# Patient Record
Sex: FEMALE | Race: WHITE | NOT HISPANIC OR LATINO | Employment: OTHER | URBAN - METROPOLITAN AREA
[De-identification: names, ages, dates, MRNs, and addresses within clinical notes are randomized per-mention and may not be internally consistent; named-entity substitution may affect disease eponyms.]

---

## 2017-01-04 ENCOUNTER — GENERIC CONVERSION - ENCOUNTER (OUTPATIENT)
Dept: OTHER | Facility: OTHER | Age: 52
End: 2017-01-04

## 2017-01-19 ENCOUNTER — GENERIC CONVERSION - ENCOUNTER (OUTPATIENT)
Dept: OTHER | Facility: OTHER | Age: 52
End: 2017-01-19

## 2017-01-26 ENCOUNTER — GENERIC CONVERSION - ENCOUNTER (OUTPATIENT)
Dept: OTHER | Facility: OTHER | Age: 52
End: 2017-01-26

## 2017-05-03 ENCOUNTER — ALLSCRIPTS OFFICE VISIT (OUTPATIENT)
Dept: OTHER | Facility: OTHER | Age: 52
End: 2017-05-03

## 2017-05-31 ENCOUNTER — GENERIC CONVERSION - ENCOUNTER (OUTPATIENT)
Dept: OTHER | Facility: OTHER | Age: 52
End: 2017-05-31

## 2017-06-28 ENCOUNTER — GENERIC CONVERSION - ENCOUNTER (OUTPATIENT)
Dept: OTHER | Facility: OTHER | Age: 52
End: 2017-06-28

## 2017-07-11 ENCOUNTER — GENERIC CONVERSION - ENCOUNTER (OUTPATIENT)
Dept: OTHER | Facility: OTHER | Age: 52
End: 2017-07-11

## 2017-07-20 ENCOUNTER — GENERIC CONVERSION - ENCOUNTER (OUTPATIENT)
Dept: OTHER | Facility: OTHER | Age: 52
End: 2017-07-20

## 2017-08-03 ENCOUNTER — ALLSCRIPTS OFFICE VISIT (OUTPATIENT)
Dept: OTHER | Facility: OTHER | Age: 52
End: 2017-08-03

## 2017-08-24 ENCOUNTER — GENERIC CONVERSION - ENCOUNTER (OUTPATIENT)
Dept: OTHER | Facility: OTHER | Age: 52
End: 2017-08-24

## 2018-01-10 NOTE — PROCEDURES
Results/Data    Procedure: Electromyogram and Nerve Conduction Study  Indication: Bilateral Upper Extremities   Referred by Dr Kate Nobles  The procedure's were discussed with the patient  Written consent was obtained prior to the procedure and is detailed in the patient's record  Prior to the start of the procedure a time out was taken and the identity of the patient was confirmed via name and date of birth with the patient  The correct site and the procedure to be performed were confirmed  The correct side was confirmed if applicable  The positioning of the patient was verified  The availability of the correct equipment was verified  Procedure Start Time: 12:45    Technique: A sterile concentric needle electrode was used  The patient tolerated the procedure well  There were no complications  Results   : Motor and sensory nerve conduction studies were performed on the bilateral median and ulnar nerves  The right median motor terminal latency was prolonged with a normal compound motor action potential amplitude and a normal conduction velocity across the wrist  The left median motor terminal latency was within normal limits with a low compound motor action potential amplitude and a normal conduction velocity across the wrist  The bilateral ulnar compound motor action potentials were within normal limits  The bilateral median and ulnar F wave latencies were within normal limits  The right median sensory peak latency was prolonged with a low sensory action potential amplitude  The left median sensory peak latency was prolonged with a low sensory action potential amplitude  The bilateral ulnar sensory action potentials were within normal limits  The right median palmar evoked response was prolonged by 2 0 ms as compared to the right ulnar palmar evoked response at the same distance   The left median palmar evoked response was prolonged by 3 0 ms as compared to the left ulnar palmar evoked response at the same distance  Concentric needle examination was performed on various proximal and distal muscles bilaterally including deltoid, biceps, triceps, pronator teres, APB, FDI and low cervical paraspinals  There was no evidence of active denervation any of the muscles tested  Moderate decreased recruitment of giant motor units was noted in the left APB and mild decreased recruitment of giant  motor units was noted in the right APB and left deltoid and biceps    The compound motor unit action potentials were of normal configuration with interference patterns being full or full for effort in the remaining muscles tested  Interpretation: There is electrophysiologic evidence of a:    1  Bilateral moderate median nerve compression neuropathy at the wrist with demyelinative and axonal changes on the left and predominant demyelinative changes on the right, consistent with a diagnosis of carpal tunnel syndrome  2  Mild chronic left C5 radiculopathy as evidenced by the decreased recruitment and chronic denervation changes in the above-mentioned muscles  3  There is no evidence of a ulnar neuropathy bilaterally  Clinical correlation is recommended        Signatures   Electronically signed by : Coco Cleveland MD; Nov 15 2016 10:40AM EST                       (Author)

## 2018-01-13 VITALS
DIASTOLIC BLOOD PRESSURE: 82 MMHG | RESPIRATION RATE: 16 BRPM | SYSTOLIC BLOOD PRESSURE: 128 MMHG | HEART RATE: 76 BPM | HEIGHT: 62 IN | WEIGHT: 257 LBS | BODY MASS INDEX: 47.29 KG/M2

## 2018-01-13 VITALS
SYSTOLIC BLOOD PRESSURE: 126 MMHG | HEART RATE: 85 BPM | WEIGHT: 254.13 LBS | HEIGHT: 62 IN | BODY MASS INDEX: 46.76 KG/M2 | DIASTOLIC BLOOD PRESSURE: 78 MMHG

## 2018-04-18 ENCOUNTER — TELEPHONE (OUTPATIENT)
Dept: NEUROLOGY | Facility: CLINIC | Age: 53
End: 2018-04-18

## 2018-04-18 NOTE — TELEPHONE ENCOUNTER
Called to clarify reason for visit ( CTS or MACRINA) reminded her to please bring her CPAP Memory card to [facility] so they can download CPAP report

## 2018-04-25 ENCOUNTER — OFFICE VISIT (OUTPATIENT)
Dept: NEUROLOGY | Facility: CLINIC | Age: 53
End: 2018-04-25
Payer: MEDICARE

## 2018-04-25 VITALS
HEIGHT: 62 IN | DIASTOLIC BLOOD PRESSURE: 98 MMHG | WEIGHT: 242 LBS | SYSTOLIC BLOOD PRESSURE: 144 MMHG | BODY MASS INDEX: 44.53 KG/M2 | HEART RATE: 88 BPM

## 2018-04-25 DIAGNOSIS — G47.33 OSA (OBSTRUCTIVE SLEEP APNEA): Primary | ICD-10-CM

## 2018-04-25 PROCEDURE — 99213 OFFICE O/P EST LOW 20 MIN: CPT | Performed by: PSYCHIATRY & NEUROLOGY

## 2018-04-25 RX ORDER — AMLODIPINE AND VALSARTAN 5; 320 MG/1; MG/1
TABLET ORAL
COMMUNITY
Start: 2016-08-05 | End: 2018-04-25

## 2018-04-25 RX ORDER — SPIRONOLACTONE 100 MG/1
100 TABLET, FILM COATED ORAL EVERY MORNING
COMMUNITY
Start: 2018-01-24 | End: 2020-09-22

## 2018-04-25 RX ORDER — PHENTERMINE HYDROCHLORIDE 37.5 MG/1
37.5 CAPSULE ORAL EVERY MORNING
COMMUNITY
End: 2019-06-07 | Stop reason: ALTCHOICE

## 2018-04-25 RX ORDER — FOLIC ACID 1 MG/1
1 TABLET ORAL DAILY
COMMUNITY
Start: 2018-03-26 | End: 2021-09-30

## 2018-04-25 RX ORDER — MELOXICAM 7.5 MG/1
7.5 TABLET ORAL DAILY PRN
COMMUNITY
Start: 2017-03-23 | End: 2018-04-25

## 2018-04-25 RX ORDER — ASPIRIN 81 MG/1
81 TABLET, CHEWABLE ORAL DAILY
COMMUNITY

## 2018-04-25 RX ORDER — TOPIRAMATE 100 MG/1
100 TABLET, FILM COATED ORAL EVERY MORNING
COMMUNITY
Start: 2018-03-01 | End: 2019-12-10 | Stop reason: ALTCHOICE

## 2018-04-25 RX ORDER — HYDROXYCHLOROQUINE SULFATE 200 MG/1
200 TABLET, FILM COATED ORAL
COMMUNITY
Start: 2017-09-06 | End: 2021-09-30

## 2018-04-25 RX ORDER — DESONIDE 0.5 MG/G
CREAM TOPICAL AS NEEDED
COMMUNITY
Start: 2017-04-21 | End: 2020-10-23 | Stop reason: SDUPTHER

## 2018-04-25 RX ORDER — LORAZEPAM 1 MG/1
TABLET ORAL DAILY PRN
COMMUNITY
Start: 2016-09-19 | End: 2021-09-30

## 2018-04-25 RX ORDER — ZOLPIDEM TARTRATE 12.5 MG/1
1 TABLET, FILM COATED, EXTENDED RELEASE ORAL
COMMUNITY
End: 2018-04-25

## 2018-04-25 RX ORDER — VENLAFAXINE HYDROCHLORIDE 225 MG/1
1 TABLET, EXTENDED RELEASE ORAL DAILY
COMMUNITY

## 2018-04-25 RX ORDER — MAG HYDROX/ALUMINUM HYD/SIMETH 400-400-40
5000 SUSPENSION, ORAL (FINAL DOSE FORM) ORAL DAILY
COMMUNITY
Start: 2016-11-23

## 2018-04-25 RX ORDER — ACETAMINOPHEN 325 MG/1
325 TABLET ORAL DAILY PRN
COMMUNITY

## 2018-04-25 RX ORDER — TOPIRAMATE 100 MG/1
25 TABLET, FILM COATED ORAL 2 TIMES DAILY
COMMUNITY
End: 2019-06-07 | Stop reason: DRUGHIGH

## 2018-04-25 RX ORDER — TRAMADOL HYDROCHLORIDE 50 MG/1
1 TABLET ORAL 3 TIMES DAILY PRN
COMMUNITY
End: 2019-06-07 | Stop reason: HOSPADM
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
## 2018-10-25 ENCOUNTER — OFFICE VISIT (OUTPATIENT)
Dept: NEUROLOGY | Facility: CLINIC | Age: 53
End: 2018-10-25
Payer: MEDICARE

## 2018-10-25 VITALS
HEIGHT: 62 IN | DIASTOLIC BLOOD PRESSURE: 80 MMHG | BODY MASS INDEX: 41.92 KG/M2 | RESPIRATION RATE: 12 BRPM | HEART RATE: 108 BPM | WEIGHT: 227.8 LBS | SYSTOLIC BLOOD PRESSURE: 140 MMHG

## 2018-10-25 DIAGNOSIS — G47.33 OSA (OBSTRUCTIVE SLEEP APNEA): Primary | ICD-10-CM

## 2018-10-25 PROCEDURE — 99213 OFFICE O/P EST LOW 20 MIN: CPT | Performed by: PSYCHIATRY & NEUROLOGY

## 2018-10-25 RX ORDER — MELOXICAM 7.5 MG/1
TABLET ORAL
COMMUNITY
Start: 2017-03-23 | End: 2019-12-10 | Stop reason: ALTCHOICE

## 2018-10-25 RX ORDER — ZOLPIDEM TARTRATE 12.5 MG/1
TABLET, FILM COATED, EXTENDED RELEASE ORAL
COMMUNITY
Start: 2015-04-16 | End: 2019-06-07 | Stop reason: HOSPADM
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
## 2019-06-07 ENCOUNTER — OFFICE VISIT (OUTPATIENT)
Dept: NEUROLOGY | Facility: CLINIC | Age: 54
End: 2019-06-07
Payer: MEDICARE

## 2019-06-07 VITALS
HEART RATE: 76 BPM | DIASTOLIC BLOOD PRESSURE: 82 MMHG | WEIGHT: 244.8 LBS | BODY MASS INDEX: 45.05 KG/M2 | SYSTOLIC BLOOD PRESSURE: 102 MMHG | HEIGHT: 62 IN

## 2019-06-07 DIAGNOSIS — G47.33 OSA (OBSTRUCTIVE SLEEP APNEA): Primary | ICD-10-CM

## 2019-06-07 PROCEDURE — 99213 OFFICE O/P EST LOW 20 MIN: CPT | Performed by: PSYCHIATRY & NEUROLOGY

## 2019-06-07 RX ORDER — MULTIVITAMIN
1 CAPSULE ORAL DAILY
COMMUNITY
End: 2019-06-07 | Stop reason: SDUPTHER

## 2019-06-07 RX ORDER — PYRIDOXINE HCL (VITAMIN B6) 100 MG
100 TABLET ORAL DAILY
COMMUNITY
End: 2021-09-30

## 2019-12-05 ENCOUNTER — TELEPHONE (OUTPATIENT)
Dept: NEUROLOGY | Facility: CLINIC | Age: 54
End: 2019-12-05

## 2019-12-10 ENCOUNTER — OFFICE VISIT (OUTPATIENT)
Dept: NEUROLOGY | Facility: CLINIC | Age: 54
End: 2019-12-10
Payer: MEDICARE

## 2019-12-10 VITALS
DIASTOLIC BLOOD PRESSURE: 80 MMHG | SYSTOLIC BLOOD PRESSURE: 122 MMHG | HEART RATE: 88 BPM | BODY MASS INDEX: 49.69 KG/M2 | WEIGHT: 270 LBS | HEIGHT: 62 IN

## 2019-12-10 DIAGNOSIS — G47.33 OSA (OBSTRUCTIVE SLEEP APNEA): Primary | ICD-10-CM

## 2019-12-10 PROCEDURE — 99214 OFFICE O/P EST MOD 30 MIN: CPT | Performed by: PSYCHIATRY & NEUROLOGY
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
## 2020-03-16 ENCOUNTER — TELEPHONE (OUTPATIENT)
Dept: NEUROLOGY | Facility: CLINIC | Age: 55
End: 2020-03-16

## 2020-03-30 ENCOUNTER — TELEPHONE (OUTPATIENT)
Dept: NEUROLOGY | Facility: CLINIC | Age: 55
End: 2020-03-30

## 2020-03-30 NOTE — TELEPHONE ENCOUNTER
pt called and states that she has a recurring charge with [facility] and she does not know where this coming from  she is asking if we have a number that she can talk to a live person  i spoke to brenden in [facility] and she gave me the number for [facility] location-[phone]  pt made aware    she will try his number

## 2020-08-12 ENCOUNTER — OFFICE VISIT (OUTPATIENT)
Dept: NEUROLOGY | Facility: CLINIC | Age: 55
End: 2020-08-12
Payer: MEDICARE

## 2020-08-12 VITALS
SYSTOLIC BLOOD PRESSURE: 142 MMHG | HEART RATE: 92 BPM | DIASTOLIC BLOOD PRESSURE: 74 MMHG | BODY MASS INDEX: 50.68 KG/M2 | OXYGEN SATURATION: 96 % | TEMPERATURE: 99.6 F | WEIGHT: 275.4 LBS | RESPIRATION RATE: 16 BRPM | HEIGHT: 62 IN

## 2020-08-12 DIAGNOSIS — G47.33 OSA (OBSTRUCTIVE SLEEP APNEA): Primary | ICD-10-CM

## 2020-08-12 PROCEDURE — 99213 OFFICE O/P EST LOW 20 MIN: CPT | Performed by: PSYCHIATRY & NEUROLOGY
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
## 2020-09-22 DIAGNOSIS — R60.0 LOCALIZED EDEMA: Primary | ICD-10-CM

## 2020-09-22 RX ORDER — SPIRONOLACTONE 100 MG/1
TABLET, FILM COATED ORAL
Qty: 90 TABLET | Refills: 0 | Status: SHIPPED | OUTPATIENT
Start: 2020-09-22 | End: 2020-10-23 | Stop reason: SDUPTHER

## 2020-10-23 ENCOUNTER — OFFICE VISIT (OUTPATIENT)
Dept: FAMILY MEDICINE CLINIC | Facility: CLINIC | Age: 55
End: 2020-10-23
Payer: MEDICARE

## 2020-10-23 VITALS
HEIGHT: 62 IN | BODY MASS INDEX: 52.01 KG/M2 | OXYGEN SATURATION: 98 % | WEIGHT: 282.6 LBS | SYSTOLIC BLOOD PRESSURE: 128 MMHG | HEART RATE: 89 BPM | TEMPERATURE: 98.4 F | DIASTOLIC BLOOD PRESSURE: 78 MMHG | RESPIRATION RATE: 18 BRPM

## 2020-10-23 DIAGNOSIS — C56.1 PRIMARY MALIGNANT NEOPLASM OF RIGHT OVARY (HCC): ICD-10-CM

## 2020-10-23 DIAGNOSIS — C56.1 OVARIAN CA, RIGHT (HCC): ICD-10-CM

## 2020-10-23 DIAGNOSIS — R60.0 LOCALIZED EDEMA: ICD-10-CM

## 2020-10-23 DIAGNOSIS — E11.9 TYPE 2 DIABETES MELLITUS WITHOUT COMPLICATION, WITHOUT LONG-TERM CURRENT USE OF INSULIN (HCC): ICD-10-CM

## 2020-10-23 DIAGNOSIS — Z28.39 IMMUNIZATION DEFICIENCY: ICD-10-CM

## 2020-10-23 DIAGNOSIS — Z11.59 ENCOUNTER FOR SCREENING FOR OTHER VIRAL DISEASES: ICD-10-CM

## 2020-10-23 DIAGNOSIS — L20.89 OTHER ATOPIC DERMATITIS: ICD-10-CM

## 2020-10-23 DIAGNOSIS — E55.9 VITAMIN D DEFICIENCY: ICD-10-CM

## 2020-10-23 DIAGNOSIS — B35.3 TINEA PEDIS OF BOTH FEET: ICD-10-CM

## 2020-10-23 DIAGNOSIS — E66.01 MORBID OBESITY (HCC): ICD-10-CM

## 2020-10-23 DIAGNOSIS — I10 ESSENTIAL HYPERTENSION: ICD-10-CM

## 2020-10-23 DIAGNOSIS — G47.33 OSA (OBSTRUCTIVE SLEEP APNEA): Primary | ICD-10-CM

## 2020-10-23 DIAGNOSIS — F41.1 GAD (GENERALIZED ANXIETY DISORDER): ICD-10-CM

## 2020-10-23 DIAGNOSIS — Z23 ENCOUNTER FOR IMMUNIZATION: ICD-10-CM

## 2020-10-23 DIAGNOSIS — Z11.59 NEED FOR HEPATITIS C SCREENING TEST: ICD-10-CM

## 2020-10-23 PROBLEM — M79.7 FIBROMYALGIA: Status: ACTIVE | Noted: 2020-10-23

## 2020-10-23 PROBLEM — M35.00 SICCA (HCC): Status: ACTIVE | Noted: 2020-10-23

## 2020-10-23 PROBLEM — I89.0 LYMPHEDEMA: Status: ACTIVE | Noted: 2020-10-23

## 2020-10-23 PROBLEM — M15.9 OSTEOARTHRITIS OF MULTIPLE JOINTS: Status: ACTIVE | Noted: 2020-10-23

## 2020-10-23 PROCEDURE — 99214 OFFICE O/P EST MOD 30 MIN: CPT

## 2020-10-23 PROCEDURE — G0008 ADMIN INFLUENZA VIRUS VAC: HCPCS

## 2020-10-23 PROCEDURE — 90670 PCV13 VACCINE IM: CPT

## 2020-10-23 PROCEDURE — 90682 RIV4 VACC RECOMBINANT DNA IM: CPT

## 2020-10-23 PROCEDURE — G0009 ADMIN PNEUMOCOCCAL VACCINE: HCPCS

## 2020-10-23 RX ORDER — SPIRONOLACTONE 100 MG/1
100 TABLET, FILM COATED ORAL EVERY MORNING
Qty: 90 TABLET | Refills: 1 | Status: SHIPPED | OUTPATIENT
Start: 2020-10-23 | End: 2021-06-02

## 2020-10-23 RX ORDER — DESONIDE 0.5 MG/G
CREAM TOPICAL 2 TIMES DAILY
Qty: 60 G | Refills: 1 | Status: SHIPPED | OUTPATIENT
Start: 2020-10-23

## 2021-01-28 ENCOUNTER — TELEPHONE (OUTPATIENT)
Dept: NEUROLOGY | Facility: CLINIC | Age: 56
End: 2021-01-28

## 2021-01-28 NOTE — TELEPHONE ENCOUNTER
Spoke with patient to verify if patient is using CPAP machine due to unable to obtain CPAP Compliance report from [facility], Patient stated stop using the CPAP machine a while ago due to she is grinding her teeth and her Dentist give her a mouth pice so use instead of CPAP, patient also wanted to cancel appt with Dr Nikolay Vegas on 2/17/21 and will call back to reschedule

## 2021-02-18 ENCOUNTER — TELEPHONE (OUTPATIENT)
Dept: FAMILY MEDICINE CLINIC | Facility: CLINIC | Age: 56
End: 2021-02-18

## 2021-02-18 NOTE — TELEPHONE ENCOUNTER
Pt called - she needs a referral for PT - it needs to say Lymphedema PT is needed  Pt has an appt on 2/22 but is worried that weather may be bad again and this could hold up her PT    Please contact her if you have any questions - [phone]

## 2021-02-19 DIAGNOSIS — I89.0 LYMPHEDEMA: Primary | ICD-10-CM

## 2021-03-01 ENCOUNTER — EVALUATION (OUTPATIENT)
Dept: PHYSICAL THERAPY | Facility: CLINIC | Age: 56
End: 2021-03-01
Payer: MEDICARE

## 2021-03-01 DIAGNOSIS — I89.0 LYMPHEDEMA OF BOTH LOWER EXTREMITIES: Primary | ICD-10-CM

## 2021-03-01 DIAGNOSIS — I89.0 LYMPHEDEMA: ICD-10-CM

## 2021-03-01 PROCEDURE — 97162 PT EVAL MOD COMPLEX 30 MIN: CPT | Performed by: PHYSICAL THERAPIST

## 2021-03-01 NOTE — LETTER
2021    Macie Olivia MD  [address] [place] [address]  [address] [facility]  Professor [address]    Patient: Maryanne Mckeon   YOB: 1965   Date of Visit: 3/1/2021     Encounter Diagnosis     ICD-10-CM    1  Lymphedema of both lower extremities  I89 0    2  Lymphedema  I89 0 Ambulatory referral to Physical Therapy       Dear Dr Skylar Camacho:    Thank you for your recent referral of Maryanne Mckeon  Please review the attached evaluation summary from Marcella's recent visit  Please verify that you agree with the plan of care by signing the attached order  If you have any questions or concerns, please do not hesitate to call  I sincerely appreciate the opportunity to share in the care of one of your patients and hope to have another opportunity to work with you in the near future  Sincerely,    Arpit Damon, PT      Referring Provider:      I certify that I have read the below Plan of Care and certify the need for these services furnished under this plan of treatment while under my care  Macie Olivia MD  [address] [place] [address]  [address] [place]          PT Evaluation     Today's date: 3/1/2021  Patient name: Maryanne Mckeon  : 1965  MRN: [id number]  Referring provider: Jesenia Avila MD  Dx:   Encounter Diagnosis     ICD-10-CM    1  Lymphedema of both lower extremities  I89 0    2   Lymphedema  I89 0 Ambulatory referral to Physical Therapy       Start Time: 1500  Stop Time: 4208  Total time in clinic (min): 45 minutes    Assessment  Assessment details: Patient presents with BL LE lymphedema, Stage 2, secondary to hysterectomy 2014 secondary to ovarian cancer, lymph nodes removed; demonstrates decreased strength, reports difficulty standing, walking, ascending/descending stairs; patient would benefit from MLD, patient education, compression garments, compression pump, therapeutic exercise; patient educated in exercise, elevation, and compression  Impairments: impaired physical strength and pain with function  Understanding of Dx/Px/POC: good   Prognosis: good    Goals  STGs  1  Decrease edema 50% in 3 weeks  2  Increase Strength half grade in 3 weeks  3  Able to mango/doff garments in 3 weeks  LTGs  1  Decrease edema 75% in 6 weeks  2  Able to self-MLD with compression pump in 6 weeks  3  Increase Strength full grade in 6 weeks  4  Able to stand, walk, ascend/descend stairs with mild to no difficulty in 6 weeks    Plan  Patient would benefit from: skilled physical therapy  Planned therapy interventions: therapeutic exercise, patient education, manual therapy and home exercise program  Frequency: 2x week  Duration in weeks: 6        Subjective Evaluation    History of Present Illness  Mechanism of injury: Secondary to surgery 2014; hysterectomy secondary to ovarian cancer, lymph nodes removed          Not a recurrent problem   Quality of life: fair    Pain  Current pain ratin  At best pain ratin  At worst pain ratin  Quality: dull ache  Relieving factors: rest  Aggravating factors: walking, standing and stair climbing    Social Support  Steps to enter house: yes  Stairs in house: yes   Lives in: multiple-level home  Lives with: spouse    Employment status: not working  Treatments  Previous treatment: physical therapy  Patient Goals  Patient goals for therapy: increased strength, decreased pain, decreased edema and increased motion  Patient goal: stand, walk, ascend/descend stairs with mild to no difficulty        Objective     Strength/Myotome Testing     Left Hip   Planes of Motion   Flexion: 4-    Right Hip   Planes of Motion   Flexion: 4-    Left Knee   Flexion: 4  Extension: 4    Right Knee   Flexion: 4  Extension: 4    Left Ankle/Foot   Dorsiflexion: 4+    Right Ankle/Foot   Dorsiflexion: 4+    General Comments:       Ankle/Foot Comments   See girth measurements on grid in chart      Flowsheet Rows      Most Recent Value   PT/OT G-Codes   Current Score  56   Projected Score  57             Precautions: history of CA; no heat      Manual             MLD                                                    Neuro Re-Ed                                                                                                        Ther Ex             Patient education             Seated hip flexion             Abd with band             Add with ball             Knee extension             TB - Df                                       Ther Activity                                       Gait Training                                       Modalities

## 2021-03-01 NOTE — PROGRESS NOTES
PT Evaluation     Today's date: 3/1/2021  Patient name: Avelina Dykes  : 1965  MRN: [id number]  Referring provider: Dina Ventura MD  Dx:   Encounter Diagnosis     ICD-10-CM    1  Lymphedema of both lower extremities  I89 0    2  Lymphedema  I89 0 Ambulatory referral to Physical Therapy       Start Time: 1500  Stop Time: 6607  Total time in clinic (min): 45 minutes    Assessment  Assessment details: Patient presents with BL LE lymphedema, Stage 2, secondary to hysterectomy 2014 secondary to ovarian cancer, lymph nodes removed; demonstrates decreased strength, reports difficulty standing, walking, ascending/descending stairs; patient would benefit from MLD, patient education, compression garments, compression pump, therapeutic exercise; patient educated in exercise, elevation, and compression  Impairments: impaired physical strength and pain with function  Understanding of Dx/Px/POC: good   Prognosis: good    Goals  STGs  1  Decrease edema 50% in 3 weeks  2  Increase Strength half grade in 3 weeks  3  Able to mango/doff garments in 3 weeks  LTGs  1  Decrease edema 75% in 6 weeks  2  Able to self-MLD with compression pump in 6 weeks  3  Increase Strength full grade in 6 weeks  4   Able to stand, walk, ascend/descend stairs with mild to no difficulty in 6 weeks    Plan  Patient would benefit from: skilled physical therapy  Planned therapy interventions: therapeutic exercise, patient education, manual therapy and home exercise program  Frequency: 2x week  Duration in weeks: 6        Subjective Evaluation    History of Present Illness  Mechanism of injury: Secondary to surgery 2014; hysterectomy secondary to ovarian cancer, lymph nodes removed          Not a recurrent problem   Quality of life: fair    Pain  Current pain ratin  At best pain ratin  At worst pain ratin  Quality: dull ache  Relieving factors: rest  Aggravating factors: walking, standing and stair climbing    Social Support  Steps to enter house: yes  Stairs in house: yes   Lives in: multiple-level home  Lives with: spouse    Employment status: not working  Treatments  Previous treatment: physical therapy  Patient Goals  Patient goals for therapy: increased strength, decreased pain, decreased edema and increased motion  Patient goal: stand, walk, ascend/descend stairs with mild to no difficulty        Objective     Strength/Myotome Testing     Left Hip   Planes of Motion   Flexion: 4-    Right Hip   Planes of Motion   Flexion: 4-    Left Knee   Flexion: 4  Extension: 4    Right Knee   Flexion: 4  Extension: 4    Left Ankle/Foot   Dorsiflexion: 4+    Right Ankle/Foot   Dorsiflexion: 4+    General Comments:       Ankle/Foot Comments   See girth measurements on grid in chart      Flowsheet Rows      Most Recent Value   PT/OT G-Codes   Current Score  56   Projected Score  57             Precautions: history of CA; no heat      Manual             MLD                                                    Neuro Re-Ed                                                                                                        Ther Ex             Patient education             Seated hip flexion             Abd with band             Add with ball             Knee extension             TB - Df                                       Ther Activity                                       Gait Training                                       Modalities

## 2021-03-23 ENCOUNTER — OFFICE VISIT (OUTPATIENT)
Dept: PHYSICAL THERAPY | Facility: CLINIC | Age: 56
End: 2021-03-23
Payer: MEDICARE

## 2021-03-23 DIAGNOSIS — I89.0 LYMPHEDEMA: Primary | ICD-10-CM

## 2021-03-23 DIAGNOSIS — I89.0 LYMPHEDEMA OF BOTH LOWER EXTREMITIES: ICD-10-CM

## 2021-03-23 PROCEDURE — 97110 THERAPEUTIC EXERCISES: CPT

## 2021-03-23 PROCEDURE — 97140 MANUAL THERAPY 1/> REGIONS: CPT

## 2021-03-23 NOTE — PROGRESS NOTES
Daily Note     Today's date: 3/23/2021  Patient name: Baron Still  : 1965  MRN: [id number]  Referring provider: Dilip Traore MD  Dx:   Encounter Diagnosis     ICD-10-CM    1  Lymphedema  I89 0    2  Lymphedema of both lower extremities  I89 0        Start Time: 1230  Stop Time: 1315  Total time in clinic (min): 45 minutes    Subjective: Patient stated that her legs are feeling okay  No new complaints  Objective: See treatment diary below      Assessment: Patient tolerated session well  Patient education on different compression options and on compression pump use  More swelling noted on L side compared to R side  Will start compression wrapping next session below knee  Performed MLD to L LE to improve lymphatic flow and decrease swelling  Patient verbally agreed w/ compression wrapping regimen  Plan: Continue per plan of care        Precautions: history of CA; no heat      Manual 3/23            MLD 20'                                                   Neuro Re-Ed                                                                                                        Ther Ex             Patient education 25'            Seated hip flexion             Abd with band             Add with ball             Knee extension             TB - Df                                       Ther Activity                                       Gait Training                                       Modalities

## 2021-03-25 ENCOUNTER — OFFICE VISIT (OUTPATIENT)
Dept: PHYSICAL THERAPY | Facility: CLINIC | Age: 56
End: 2021-03-25
Payer: MEDICARE

## 2021-03-25 DIAGNOSIS — I89.0 LYMPHEDEMA OF BOTH LOWER EXTREMITIES: ICD-10-CM

## 2021-03-25 DIAGNOSIS — I89.0 LYMPHEDEMA: Primary | ICD-10-CM

## 2021-03-25 PROCEDURE — 97140 MANUAL THERAPY 1/> REGIONS: CPT

## 2021-03-25 NOTE — PROGRESS NOTES
Daily Note     Today's date: 3/25/2021  Patient name: Baron Still  : 1965  MRN: [id number]  Referring provider: Dilip Traore MD  Dx:   Encounter Diagnosis     ICD-10-CM    1  Lymphedema  I89 0    2  Lymphedema of both lower extremities  I89 0        Start Time: 1230  Stop Time: 1315  Total time in clinic (min): 45 minutes    Subjective: Patient reports no new complaints  Patient stated she has no pain in legs just swelling  Objective: See treatment diary below      Assessment: Compression wrapped BL lower legs w/ compression wraps to improve lymphatic flow and reduce swelling  Patient required a re-wrapping on R leg due to wraps being to tight and causing toes to turn blue  Patient skin color and integrity in toes were fine post wrapping   Plan: Continue per plan of care        Precautions: history of CA; no heat      Manual 3/23 3/25           MLD 20'            Wrapping  39'                                     Neuro Re-Ed                                                                                                        Ther Ex             Patient education 25'            Seated hip flexion             Abd with band             Add with ball             Knee extension             TB - Df                                       Ther Activity                                       Gait Training                                       Modalities

## 2021-03-30 ENCOUNTER — OFFICE VISIT (OUTPATIENT)
Dept: PHYSICAL THERAPY | Facility: CLINIC | Age: 56
End: 2021-03-30
Payer: MEDICARE

## 2021-03-30 DIAGNOSIS — I89.0 LYMPHEDEMA OF BOTH LOWER EXTREMITIES: ICD-10-CM

## 2021-03-30 DIAGNOSIS — I89.0 LYMPHEDEMA: Primary | ICD-10-CM

## 2021-03-30 PROCEDURE — 97140 MANUAL THERAPY 1/> REGIONS: CPT

## 2021-03-30 NOTE — PROGRESS NOTES
Daily Note     Today's date: 3/30/2021  Patient name: Mary Sandoval  : 1965  MRN: [id number]  Referring provider: Jill Bardales MD  Dx:   Encounter Diagnosis     ICD-10-CM    1  Lymphedema  I89 0    2  Lymphedema of both lower extremities  I89 0        Start Time: 1100  Stop Time: 1145  Total time in clinic (min): 45 minutes    Subjective: Patient reports she kept the wraps on until  and then used to velcro wraps  Patient stated she has to start the velcro up higher then the ankle due to it making it easier to walk  Objective: See treatment diary below      Assessment: Patient had increased swelling in BL malleolus area possibly due to starting the compression garment up higher causing an increase in swelling  Added foam to BL malleoli to reduce swelling and improve lymphatic flow  Trying no compression wraps on toes this time  Continued to utilize compression wrapping to lower legs to improve lymphatic flow and reduce swelling  Plan: Continue per plan of care        Precautions: history of CA; no heat      Manual 3/23 3/25 3/30          MLD 20'            Wrapping  39' 39'                                    Neuro Re-Ed                                                                                                        Ther Ex             Patient education 25'            Seated hip flexion             Abd with band             Add with ball             Knee extension             TB - Df                                       Ther Activity                                       Gait Training                                       Modalities

## 2021-04-01 ENCOUNTER — OFFICE VISIT (OUTPATIENT)
Dept: PHYSICAL THERAPY | Facility: CLINIC | Age: 56
End: 2021-04-01
Payer: MEDICARE

## 2021-04-01 DIAGNOSIS — I89.0 LYMPHEDEMA OF BOTH LOWER EXTREMITIES: ICD-10-CM

## 2021-04-01 DIAGNOSIS — I89.0 LYMPHEDEMA: Primary | ICD-10-CM

## 2021-04-01 PROCEDURE — 97140 MANUAL THERAPY 1/> REGIONS: CPT

## 2021-04-01 NOTE — PROGRESS NOTES
Daily Note     Today's date: 2021  Patient name: Janene Lin  : 1965  MRN: [id number]  Referring provider: Saint Brewer, MD  Dx:   Encounter Diagnosis     ICD-10-CM    1  Lymphedema  I89 0    2  Lymphedema of both lower extremities  I89 0        Start Time: 1230  Stop Time: 1315  Total time in clinic (min): 45 minutes    Subjective: Patient stated that wraps came off early then usual this time due to tape coming off  Objective: See treatment diary below      Assessment: Patient's ankles looked and felt better w/ reduced tightness on medial and lateral aspects  Continued to wrap lower legs w/ compression wraps to improve lymphatic flow and reduce girth measurements so patient can wear compression garments  MLD performed to L upper leg with the goal to improve lymphatic flow and reduce swelling  Educated patient on sign of lymphedema and lipedema  Plan: Continue per plan of care        Precautions: history of CA; no heat      Manual 3/23 3/25 3/30 4/1         MLD 20'   15'         Wrapping  45' 45' 30'                                   Neuro Re-Ed                                                                                                        Ther Ex             Patient education 25'            Seated hip flexion             Abd with band             Add with ball             Knee extension             TB - Df                                       Ther Activity                                       Gait Training                                       Modalities

## 2021-04-06 ENCOUNTER — APPOINTMENT (OUTPATIENT)
Dept: PHYSICAL THERAPY | Facility: CLINIC | Age: 56
End: 2021-04-06
Payer: MEDICARE

## 2021-04-08 ENCOUNTER — OFFICE VISIT (OUTPATIENT)
Dept: PHYSICAL THERAPY | Facility: CLINIC | Age: 56
End: 2021-04-08
Payer: MEDICARE

## 2021-04-08 DIAGNOSIS — I89.0 LYMPHEDEMA: Primary | ICD-10-CM

## 2021-04-08 DIAGNOSIS — I89.0 LYMPHEDEMA OF BOTH LOWER EXTREMITIES: ICD-10-CM

## 2021-04-08 PROCEDURE — 97140 MANUAL THERAPY 1/> REGIONS: CPT

## 2021-04-08 NOTE — PROGRESS NOTES
Daily Note     Today's date: 2021  Patient name: Anton Argueta  : 1965  MRN: [id number]  Referring provider: Bernie Cortez MD  Dx:   Encounter Diagnosis     ICD-10-CM    1  Lymphedema  I89 0    2  Lymphedema of both lower extremities  I89 0        Start Time: 1145  Stop Time: 1230  Total time in clinic (min): 45 minutes    Subjective: Patient stated her legs are feeling a little smaller and softer  Objective: See treatment diary below      Assessment: Continued to wrap BL lower legs w/ compression wraps to improve lymphatic flow and reduce girth measurements  Patients thighs are softer  Dry skin noted in legs and discussed importance of applying lotion  Reduced girth measurements in BL legs recorded  Patient will be measured for custom compression garments next week  Patient was educated on compression, elevation, and exercising  Plan: Continue per plan of care        Precautions: history of CA; no heat      Manual 3/23 3/25 3/30 4/1 4/8        MLD 20'   15' 15'        Wrapping  45' 45' 30' 30'                                  Neuro Re-Ed                                                                                                        Ther Ex             Patient education 25'            Seated hip flexion             Abd with band             Add with ball             Knee extension             TB - Df                                       Ther Activity                                       Gait Training                                       Modalities

## 2021-04-12 ENCOUNTER — OFFICE VISIT (OUTPATIENT)
Dept: PHYSICAL THERAPY | Facility: CLINIC | Age: 56
End: 2021-04-12
Payer: MEDICARE

## 2021-04-12 DIAGNOSIS — I89.0 LYMPHEDEMA: Primary | ICD-10-CM

## 2021-04-12 DIAGNOSIS — I89.0 LYMPHEDEMA OF BOTH LOWER EXTREMITIES: ICD-10-CM

## 2021-04-12 PROCEDURE — 97140 MANUAL THERAPY 1/> REGIONS: CPT

## 2021-04-12 NOTE — PROGRESS NOTES
Daily Note     Today's date: 2021  Patient name: Shady Truong  : 1965  MRN: [id number]  Referring provider: Marlen Peña MD  Dx:   Encounter Diagnosis     ICD-10-CM    1  Lymphedema  I89 0    2  Lymphedema of both lower extremities  I89 0        Start Time: 1230  Stop Time: 1315  Total time in clinic (min): 45 minutes    Subjective: Patient stated her legs and ankles are feeling smaller  Objective: See treatment diary below      Assessment: Patient tolerated session well  Measured for Custom [employer] compression garments  Performed MLD to BL thigh to reduce tightness and improve lymphatic flow  Plan: Continue per plan of care        Precautions: history of CA; no heat      Manual 3/23 3/25 3/30 4/1 4/8 4/12       MLD 20'   15' 15' 15'       Wrapping  45' 45' 30' 30'        Measure garments      30'                    Neuro Re-Ed                                                                                                        Ther Ex             Patient education 25'            Seated hip flexion             Abd with band             Add with ball             Knee extension             TB - Df                                       Ther Activity                                       Gait Training                                       Modalities

## 2021-04-15 ENCOUNTER — OFFICE VISIT (OUTPATIENT)
Dept: PHYSICAL THERAPY | Facility: CLINIC | Age: 56
End: 2021-04-15
Payer: MEDICARE

## 2021-04-15 DIAGNOSIS — I89.0 LYMPHEDEMA OF BOTH LOWER EXTREMITIES: ICD-10-CM

## 2021-04-15 DIAGNOSIS — I89.0 LYMPHEDEMA: Primary | ICD-10-CM

## 2021-04-15 PROCEDURE — 97140 MANUAL THERAPY 1/> REGIONS: CPT

## 2021-04-15 NOTE — PROGRESS NOTES
Daily Note     Today's date: 4/15/2021  Patient name: Rhys Mead  : 1965  MRN: [id number]  Referring provider: Kim Noel MD  Dx:   Encounter Diagnosis     ICD-10-CM    1  Lymphedema  I89 0    2  Lymphedema of both lower extremities  I89 0        Start Time: 1230  Stop Time: 1315  Total time in clinic (min): 45 minutes    Subjective: Patient stated she is feeling a little sore due to the weather  Objective: See treatment diary below      Assessment: Patient tolerated session well  Continued w/ compression wrapping to reduce swelling and improve lymphatic flow  Patient has stage 2 lymphedema that has been managed w/ compression wrapping, elevation, and exercise  Patient is ordering two custom compression garments including [employer] knee-high w/ open toed  Patient is also ordering two night time garments including the [employer] knee high, medium size and regular length  Patient would benefit from more continued skilled PT  Plan: Continue per plan of care        Precautions: history of CA; no heat      Manual 3/23 3/25 3/30 4/1 4/8 4/12 4/15      MLD 20'   15' 15' 15'       Wrapping  45' 45' 30' 30'  40'      Measure garments      30'                    Neuro Re-Ed                                                                                                        Ther Ex             Patient education 25'            Seated hip flexion             Abd with band             Add with ball             Knee extension             TB - Df                                       Ther Activity                                       Gait Training                                       Modalities

## 2021-04-20 ENCOUNTER — OFFICE VISIT (OUTPATIENT)
Dept: PHYSICAL THERAPY | Facility: CLINIC | Age: 56
End: 2021-04-20
Payer: MEDICARE

## 2021-04-20 DIAGNOSIS — I89.0 LYMPHEDEMA OF BOTH LOWER EXTREMITIES: ICD-10-CM

## 2021-04-20 DIAGNOSIS — I89.0 LYMPHEDEMA: Primary | ICD-10-CM

## 2021-04-20 PROCEDURE — 97140 MANUAL THERAPY 1/> REGIONS: CPT

## 2021-04-20 PROCEDURE — 97110 THERAPEUTIC EXERCISES: CPT

## 2021-04-20 NOTE — PROGRESS NOTES
Daily Note     Today's date: 2021  Patient name: Linda Hackett  : 1965  MRN: [id number]  Referring provider: Kj Palacios MD  Dx:   Encounter Diagnosis     ICD-10-CM    1  Lymphedema  I89 0    2  Lymphedema of both lower extremities  I89 0        Start Time: 1230  Stop Time: 1315  Total time in clinic (min): 45 minutes    Subjective: Patient reports her night time compression garments arrived  Vince Royal feels her legs are improving  Objective: See treatment diary below      Assessment: Reduced swelling noted in BL ankles and lower leg  Discharging the compression wrapping due to patient having Bevely Danker wraps to take the place of wrapping  Compression garments fit well  Performed MLD to BL LE to improve lymphatic flow and decrease swelling  Plan: Continue per plan of care        Precautions: history of CA; no heat      Manual 3/23 3/25 3/30 4/1 4/8 4/12 4/15 4/20     MLD 20'   15' 15' 15'       Wrapping  45' 45' 30' 30'  40' 35'     Measure garments      30'                    Neuro Re-Ed                                                                                                        Ther Ex             Patient education 25'       Garments 10'     Seated hip flexion             Abd with band             Add with ball             Knee extension             TB - Df                                       Ther Activity                                       Gait Training                                       Modalities

## 2021-04-22 ENCOUNTER — OFFICE VISIT (OUTPATIENT)
Dept: PHYSICAL THERAPY | Facility: CLINIC | Age: 56
End: 2021-04-22
Payer: MEDICARE

## 2021-04-22 DIAGNOSIS — I89.0 LYMPHEDEMA OF BOTH LOWER EXTREMITIES: ICD-10-CM

## 2021-04-22 DIAGNOSIS — I89.0 LYMPHEDEMA: Primary | ICD-10-CM

## 2021-04-22 PROCEDURE — 97140 MANUAL THERAPY 1/> REGIONS: CPT

## 2021-04-22 PROCEDURE — 97110 THERAPEUTIC EXERCISES: CPT

## 2021-04-22 NOTE — PROGRESS NOTES
Daily Note     Today's date: 2021  Patient name: Annabel Lisa  : 1965  MRN: [id number]  Referring provider: Bernie Marquez MD  Dx:   Encounter Diagnosis     ICD-10-CM    1  Lymphedema  I89 0    2  Lymphedema of both lower extremities  I89 0        Start Time: 1230  Stop Time: 1315  Total time in clinic (min): 45 minutes    Subjective: Patient reports having no new complaints  Patient reported garments are working well  Objective: See treatment diary below      Assessment: Lower legs and ankles are looking better upon inspection  Discussed about the compression pump and garment sizing  Performed MLD to BL LE, which improved lymphatic integrity  Plan: Continue per plan of care        Precautions: history of CA; no heat      Manual 3/23 3/25 3/30 4/1 4/8 4/12 4/15 4/20 4/22    MLD 20'   15' 15' 15'   30'    Wrapping  45' 45' 30' 30'  40' 35'     Measure garments      30'                    Neuro Re-Ed                                                                                                        Ther Ex             Patient education 22'       Garments 10' 15' pump/garment    Seated hip flexion             Abd with band             Add with ball             Knee extension             TB - Df                                       Ther Activity                                       Gait Training                                       Modalities

## 2021-04-27 ENCOUNTER — APPOINTMENT (OUTPATIENT)
Dept: PHYSICAL THERAPY | Facility: CLINIC | Age: 56
End: 2021-04-27
Payer: MEDICARE

## 2021-04-28 ENCOUNTER — OFFICE VISIT (OUTPATIENT)
Dept: PHYSICAL THERAPY | Facility: CLINIC | Age: 56
End: 2021-04-28
Payer: MEDICARE

## 2021-04-28 DIAGNOSIS — I89.0 LYMPHEDEMA OF BOTH LOWER EXTREMITIES: ICD-10-CM

## 2021-04-28 DIAGNOSIS — I89.0 LYMPHEDEMA: Primary | ICD-10-CM

## 2021-04-28 PROCEDURE — 97110 THERAPEUTIC EXERCISES: CPT

## 2021-04-28 PROCEDURE — 97140 MANUAL THERAPY 1/> REGIONS: CPT

## 2021-04-28 NOTE — PROGRESS NOTES
Daily Note/Discharge Note     Today's date: 2021  Patient name: Brenda Espinoza  : 1965  MRN: [id number]  Referring provider: Casandra Joseph MD  Dx:   Encounter Diagnosis     ICD-10-CM    1  Lymphedema  I89 0    2  Lymphedema of both lower extremities  I89 0        Start Time: 1230  Stop Time: 1315  Total time in clinic (min): 45 minutes    Subjective: Patient reports legs are doing well  No new complaints  Objective: See treatment diary below      Assessment: Patient tolerated session well  Reduced girth measurements noted  Patient has new custom garments, garments fit well w/ no problems  Patient is still waiting on compression pump but shoulder receive pump soon  Patient has met all goals and can manage lymphedema at home  Education on how often to receive treatment  Plan: Discharge PT at this time        Precautions: history of CA; no heat      Manual 3/23 3/25 3/30 4/1 4/8 4/12 4/15 4/20 4/22 4/28   MLD 20'   13' 15' 15'   30' 30'   Wrapping  45' 45' 30' 30'  40' 35'     Measure garments      30'                    Neuro Re-Ed                                                                                                        Ther Ex             Patient education 22'       Garments 10' 15' pump/garment 15'   Seated hip flexion             Abd with band             Add with ball             Knee extension             TB - Df                                       Ther Activity                                       Gait Training                                       Modalities

## 2021-05-05 ENCOUNTER — TELEPHONE (OUTPATIENT)
Dept: FAMILY MEDICINE CLINIC | Facility: CLINIC | Age: 56
End: 2021-05-05

## 2021-05-05 NOTE — TELEPHONE ENCOUNTER
Called and left message on patient voicemail Mammogram is normal and any questions to call office    Alfredo Callaway

## 2021-05-05 NOTE — TELEPHONE ENCOUNTER
----- Message from Kerri Batres MD sent at 5/5/2021  1:15 PM EDT -----  Please notify pt of normal mammogram

## 2021-05-21 ENCOUNTER — TELEPHONE (OUTPATIENT)
Dept: FAMILY MEDICINE CLINIC | Facility: CLINIC | Age: 56
End: 2021-05-21

## 2021-05-21 NOTE — TELEPHONE ENCOUNTER
Phuong Stiles called stating she is still missing ICD 10 code I 89 0  Needs to be put in section B and resent to fax# [phone]  She said to call if you need any assistance

## 2021-06-02 DIAGNOSIS — R60.0 LOCALIZED EDEMA: ICD-10-CM

## 2021-06-02 RX ORDER — SPIRONOLACTONE 100 MG/1
TABLET, FILM COATED ORAL
Qty: 90 TABLET | Refills: 0 | Status: SHIPPED | OUTPATIENT
Start: 2021-06-02 | End: 2021-08-30 | Stop reason: SDUPTHER

## 2021-08-30 DIAGNOSIS — R60.0 LOCALIZED EDEMA: ICD-10-CM

## 2021-08-30 RX ORDER — SPIRONOLACTONE 100 MG/1
100 TABLET, FILM COATED ORAL EVERY MORNING
Qty: 90 TABLET | Refills: 0 | Status: SHIPPED | OUTPATIENT
Start: 2021-08-30 | End: 2021-09-01 | Stop reason: SDUPTHER

## 2021-09-01 DIAGNOSIS — R60.0 LOCALIZED EDEMA: ICD-10-CM

## 2021-09-01 RX ORDER — SPIRONOLACTONE 100 MG/1
100 TABLET, FILM COATED ORAL EVERY MORNING
Qty: 30 TABLET | Refills: 0 | Status: SHIPPED | OUTPATIENT
Start: 2021-09-01 | End: 2021-09-30 | Stop reason: SDUPTHER

## 2021-09-30 ENCOUNTER — OFFICE VISIT (OUTPATIENT)
Dept: FAMILY MEDICINE CLINIC | Facility: CLINIC | Age: 56
End: 2021-09-30
Payer: MEDICARE

## 2021-09-30 VITALS
DIASTOLIC BLOOD PRESSURE: 72 MMHG | HEIGHT: 62 IN | HEART RATE: 80 BPM | WEIGHT: 285.4 LBS | OXYGEN SATURATION: 96 % | TEMPERATURE: 98.8 F | SYSTOLIC BLOOD PRESSURE: 126 MMHG | BODY MASS INDEX: 52.52 KG/M2

## 2021-09-30 DIAGNOSIS — E55.9 VITAMIN D DEFICIENCY: ICD-10-CM

## 2021-09-30 DIAGNOSIS — Z11.59 ENCOUNTER FOR SCREENING FOR OTHER VIRAL DISEASES: ICD-10-CM

## 2021-09-30 DIAGNOSIS — Z28.39 IMMUNIZATION DEFICIENCY: ICD-10-CM

## 2021-09-30 DIAGNOSIS — C56.1 OVARIAN CA, RIGHT (HCC): ICD-10-CM

## 2021-09-30 DIAGNOSIS — I89.0 LYMPHEDEMA: ICD-10-CM

## 2021-09-30 DIAGNOSIS — Z23 NEED FOR INFLUENZA VACCINATION: ICD-10-CM

## 2021-09-30 DIAGNOSIS — I10 ESSENTIAL HYPERTENSION: ICD-10-CM

## 2021-09-30 DIAGNOSIS — R60.0 LOCALIZED EDEMA: ICD-10-CM

## 2021-09-30 DIAGNOSIS — R63.8 INCREASED BMI: ICD-10-CM

## 2021-09-30 DIAGNOSIS — E53.8 CYANOCOBALAMIN DEFICIENCY: ICD-10-CM

## 2021-09-30 DIAGNOSIS — E66.01 MORBID OBESITY (HCC): ICD-10-CM

## 2021-09-30 DIAGNOSIS — F41.1 GAD (GENERALIZED ANXIETY DISORDER): ICD-10-CM

## 2021-09-30 DIAGNOSIS — E11.9 TYPE 2 DIABETES MELLITUS WITHOUT COMPLICATION, WITHOUT LONG-TERM CURRENT USE OF INSULIN (HCC): Primary | ICD-10-CM

## 2021-09-30 DIAGNOSIS — M79.7 FIBROMYALGIA: ICD-10-CM

## 2021-09-30 DIAGNOSIS — R91.8 MULTIPLE LUNG NODULES ON CT: ICD-10-CM

## 2021-09-30 DIAGNOSIS — Z11.4 SCREENING FOR HIV (HUMAN IMMUNODEFICIENCY VIRUS): ICD-10-CM

## 2021-09-30 DIAGNOSIS — Z00.00 MEDICARE ANNUAL WELLNESS VISIT, SUBSEQUENT: ICD-10-CM

## 2021-09-30 PROCEDURE — G0439 PPPS, SUBSEQ VISIT: HCPCS

## 2021-09-30 PROCEDURE — G0008 ADMIN INFLUENZA VIRUS VAC: HCPCS

## 2021-09-30 PROCEDURE — 90682 RIV4 VACC RECOMBINANT DNA IM: CPT

## 2021-09-30 PROCEDURE — 99214 OFFICE O/P EST MOD 30 MIN: CPT

## 2021-09-30 RX ORDER — FLUOXETINE HYDROCHLORIDE 20 MG/1
CAPSULE ORAL
COMMUNITY
End: 2021-09-30

## 2021-09-30 RX ORDER — SPIRONOLACTONE 100 MG/1
100 TABLET, FILM COATED ORAL EVERY MORNING
Qty: 90 TABLET | Refills: 1 | Status: SHIPPED | OUTPATIENT
Start: 2021-09-30 | End: 2022-05-02

## 2021-09-30 RX ORDER — NYSTATIN 100000 [USP'U]/G
POWDER TOPICAL
COMMUNITY
Start: 2021-09-09
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
## 2021-09-30 NOTE — PATIENT INSTRUCTIONS
Medicare Preventive Visit Patient Instructions  Thank you for completing your Welcome to Medicare Visit or Medicare Annual Wellness Visit today  Your next wellness visit will be due in one year (10/1/2022)  The screening/preventive services that you may require over the next 5-10 years are detailed below  Some tests may not apply to you based off risk factors and/or age  Screening tests ordered at today's visit but not completed yet may show as past due  Also, please note that scanned in results may not display below  Preventive Screenings:  Service Recommendations Previous Testing/Comments   Colorectal Cancer Screening  * Colonoscopy    * Fecal Occult Blood Test (FOBT)/Fecal Immunochemical Test (FIT)  * Fecal DNA/Cologuard Test  * Flexible Sigmoidoscopy Age: 54-65 years old   Colonoscopy: every 10 years (may be performed more frequently if at higher risk)  OR  FOBT/FIT: every 1 year  OR  Cologuard: every 3 years  OR  Sigmoidoscopy: every 5 years  Screening may be recommended earlier than age 48 if at higher risk for colorectal cancer  Also, an individualized decision between you and your healthcare provider will decide whether screening between the ages of 74-80 would be appropriate  Colonoscopy: Not on file  FOBT/FIT: Not on file  Cologuard: Not on file  Sigmoidoscopy: Not on file          Breast Cancer Screening Age: 36 years old  Frequency: every 1-2 years  Not required if history of left and right mastectomy Mammogram: 04/29/2021    Screening Current   Cervical Cancer Screening Between the ages of 21-29, pap smear recommended once every 3 years  Between the ages of 33-67, can perform pap smear with HPV co-testing every 5 years     Recommendations may differ for women with a history of total hysterectomy, cervical cancer, or abnormal pap smears in past  Pap Smear: Not on file        Hepatitis C Screening Once for adults born between 1945 and 1965  More frequently in patients at high risk for Hepatitis C Hep C Antibody: Not on file        Diabetes Screening 1-2 times per year if you're at risk for diabetes or have pre-diabetes Fasting glucose: No results in last 5 years   A1C: 6 8 %    Screening Not Indicated  History Diabetes   Cholesterol Screening Once every 5 years if you don't have a lipid disorder  May order more often based on risk factors  Lipid panel: 10/10/2018    Screening Current     Other Preventive Screenings Covered by Medicare:  1  Abdominal Aortic Aneurysm (AAA) Screening: covered once if your at risk  You're considered to be at risk if you have a family history of AAA  2  Lung Cancer Screening: covers low dose CT scan once per year if you meet all of the following conditions: (1) Age 50-69; (2) No signs or symptoms of lung cancer; (3) Current smoker or have quit smoking within the last 15 years; (4) You have a tobacco smoking history of at least 30 pack years (packs per day multiplied by number of years you smoked); (5) You get a written order from a healthcare provider  3  Glaucoma Screening: covered annually if you're considered high risk: (1) You have diabetes OR (2) Family history of glaucoma OR (3)  aged 48 and older OR (3)  American aged 72 and older  3  Osteoporosis Screening: covered every 2 years if you meet one of the following conditions: (1) You're estrogen deficient and at risk for osteoporosis based off medical history and other findings; (2) Have a vertebral abnormality; (3) On glucocorticoid therapy for more than 3 months; (4) Have primary hyperparathyroidism; (5) On osteoporosis medications and need to assess response to drug therapy  · Last bone density test (DXA Scan): Not on file  5  HIV Screening: covered annually if you're between the age of 12-76  Also covered annually if you are younger than 13 and older than 72 with risk factors for HIV infection  For pregnant patients, it is covered up to 3 times per pregnancy      Immunizations:  Immunization Recommendations   Influenza Vaccine Annual influenza vaccination during flu season is recommended for all persons aged >= 6 months who do not have contraindications   Pneumococcal Vaccine (Prevnar and Pneumovax)  * Prevnar = PCV13  * Pneumovax = PPSV23   Adults 25-60 years old: 1-3 doses may be recommended based on certain risk factors  Adults 72 years old: Prevnar (PCV13) vaccine recommended followed by Pneumovax (PPSV23) vaccine  If already received PPSV23 since turning 65, then PCV13 recommended at least one year after PPSV23 dose  Hepatitis B Vaccine 3 dose series if at intermediate or high risk (ex: diabetes, end stage renal disease, liver disease)   Tetanus (Td) Vaccine - COST NOT COVERED BY MEDICARE PART B Following completion of primary series, a booster dose should be given every 10 years to maintain immunity against tetanus  Td may also be given as tetanus wound prophylaxis  Tdap Vaccine - COST NOT COVERED BY MEDICARE PART B Recommended at least once for all adults  For pregnant patients, recommended with each pregnancy  Shingles Vaccine (Shingrix) - COST NOT COVERED BY MEDICARE PART B  2 shot series recommended in those aged 48 and above     Health Maintenance Due:      Topic Date Due    Hepatitis C Screening  Never done    HIV Screening  Never done    Cervical Cancer Screening  Never done    Colorectal Cancer Screening  Never done    Breast Cancer Screening: Mammogram  04/29/2022     Immunizations Due:      Topic Date Due    DTaP,Tdap,and Td Vaccines (3 - Td or Tdap) 01/08/2017    Pneumococcal Vaccine: Pediatrics (0 to 5 Years) and At-Risk Patients (6 to 59 Years) (1 of 2 - PPSV23) 12/18/2020    Influenza Vaccine (1) 09/01/2021     Advance Directives   What are advance directives? Advance directives are legal documents that state your wishes and plans for medical care  These plans are made ahead of time in case you lose your ability to make decisions for yourself   Advance directives can apply to any medical decision, such as the treatments you want, and if you want to donate organs  What are the types of advance directives? There are many types of advance directives, and each state has rules about how to use them  You may choose a combination of any of the following:  · Living will: This is a written record of the treatment you want  You can also choose which treatments you do not want, which to limit, and which to stop at a certain time  This includes surgery, medicine, IV fluid, and tube feedings  · Durable power of  for healthcare [facility]): This is a written record that states who you want to make healthcare choices for you when you are unable to make them for yourself  This person, called a proxy, is usually a family member or a friend  You may choose more than 1 proxy  · Do not resuscitate (DNR) order:  A DNR order is used in case your heart stops beating or you stop breathing  It is a request not to have certain forms of treatment, such as CPR  A DNR order may be included in other types of advance directives  · Medical directive: This covers the care that you want if you are in a coma, near death, or unable to make decisions for yourself  You can list the treatments you want for each condition  Treatment may include pain medicine, surgery, blood transfusions, dialysis, IV or tube feedings, and a ventilator (breathing machine)  · Values history: This document has questions about your views, beliefs, and how you feel and think about life  This information can help others choose the care that you would choose  Why are advance directives important? An advance directive helps you control your care  Although spoken wishes may be used, it is better to have your wishes written down  Spoken wishes can be misunderstood, or not followed  Treatments may be given even if you do not want them   An advance directive may make it easier for your family to make difficult choices about your care    Urinary Incontinence   Urinary incontinence (UI)  is when you lose control of your bladder  UI develops because your bladder cannot store or empty urine properly  The 3 most common types of UI are stress incontinence, urge incontinence, or both  Medicines:   · May be given to help strengthen your bladder control  Report any side effects of medication to your healthcare provider  Do pelvic muscle exercises often:  Your pelvic muscles help you stop urinating  Squeeze these muscles tight for 5 seconds, then relax for 5 seconds  Gradually work up to squeezing for 10 seconds  Do 3 sets of 15 repetitions a day, or as directed  This will help strengthen your pelvic muscles and improve bladder control  Train your bladder:  Go to the bathroom at set times, such as every 2 hours, even if you do not feel the urge to go  You can also try to hold your urine when you feel the urge to go  For example, hold your urine for 5 minutes when you feel the urge to go  As that becomes easier, hold your urine for 10 minutes  Self-care:   · Keep a UI record  Write down how often you leak urine and how much you leak  Make a note of what you were doing when you leaked urine  · Drink liquids as directed  You may need to limit the amount of liquid you drink to help control your urine leakage  Do not drink any liquid right before you go to bed  Limit or do not have drinks that contain caffeine or alcohol  · Prevent constipation  Eat a variety of high-fiber foods  Good examples are high-fiber cereals, beans, vegetables, and whole-grain breads  Walking is the best way to trigger your intestines to have a bowel movement  · Exercise regularly and maintain a healthy weight  Weight loss and exercise will decrease pressure on your bladder and help you control your leakage  · Use a catheter as directed  to help empty your bladder  A catheter is a tiny, plastic tube that is put into your bladder to drain your urine     · Go to behavior therapy as directed  Behavior therapy may be used to help you learn to control your urge to urinate  Weight Management   Why it is important to manage your weight:  Being overweight increases your risk of health conditions such as heart disease, high blood pressure, type 2 diabetes, and certain types of cancer  It can also increase your risk for osteoarthritis, sleep apnea, and other respiratory problems  Aim for a slow, steady weight loss  Even a small amount of weight loss can lower your risk of health problems  How to lose weight safely:  A safe and healthy way to lose weight is to eat fewer calories and get regular exercise  You can lose up about 1 pound a week by decreasing the number of calories you eat by 500 calories each day  Healthy meal plan for weight management:  A healthy meal plan includes a variety of foods, contains fewer calories, and helps you stay healthy  A healthy meal plan includes the following:  · Eat whole-grain foods more often  A healthy meal plan should contain fiber  Fiber is the part of grains, fruits, and vegetables that is not broken down by your body  Whole-grain foods are healthy and provide extra fiber in your diet  Some examples of whole-grain foods are whole-wheat breads and pastas, oatmeal, brown rice, and bulgur  · Eat a variety of vegetables every day  Include dark, leafy greens such as spinach, kale, mel greens, and mustard greens  Eat yellow and orange vegetables such as carrots, sweet potatoes, and winter squash  · Eat a variety of fruits every day  Choose fresh or canned fruit (canned in its own juice or light syrup) instead of juice  Fruit juice has very little or no fiber  · Eat low-fat dairy foods  Drink fat-free (skim) milk or 1% milk  Eat fat-free yogurt and low-fat cottage cheese  Try low-fat cheeses such as mozzarella and other reduced-fat cheeses  · Choose meat and other protein foods that are low in fat    Choose beans or other legumes such as split peas or lentils  Choose fish, skinless poultry (chicken or turkey), or lean cuts of red meat (beef or pork)  Before you cook meat or poultry, cut off any visible fat  · Use less fat and oil  Try baking foods instead of frying them  Add less fat, such as margarine, sour cream, regular salad dressing and mayonnaise to foods  Eat fewer high-fat foods  Some examples of high-fat foods include french fries, doughnuts, ice cream, and cakes  · Eat fewer sweets  Limit foods and drinks that are high in sugar  This includes candy, cookies, regular soda, and sweetened drinks  Exercise:  Exercise at least 30 minutes per day on most days of the week  Some examples of exercise include walking, biking, dancing, and swimming  You can also fit in more physical activity by taking the stairs instead of the elevator or parking farther away from stores  Ask your healthcare provider about the best exercise plan for you  © Copyright [employer] 2018 Information is for End User's use only and may not be sold, redistributed or otherwise used for commercial purposes   All illustrations and images included in CareNotes® are the copyrighted property of [employer] M [employer]  or [address] [place] [address]

## 2022-01-05 DIAGNOSIS — F41.1 GAD (GENERALIZED ANXIETY DISORDER): Primary | ICD-10-CM

## 2022-03-30 ENCOUNTER — OFFICE VISIT (OUTPATIENT)
Dept: FAMILY MEDICINE CLINIC | Facility: CLINIC | Age: 57
End: 2022-03-30
Payer: MEDICARE

## 2022-03-30 VITALS
SYSTOLIC BLOOD PRESSURE: 130 MMHG | HEART RATE: 78 BPM | DIASTOLIC BLOOD PRESSURE: 98 MMHG | OXYGEN SATURATION: 97 % | WEIGHT: 277 LBS | BODY MASS INDEX: 50.97 KG/M2 | TEMPERATURE: 98.5 F | HEIGHT: 62 IN

## 2022-03-30 DIAGNOSIS — L98.9 SKIN LESION: ICD-10-CM

## 2022-03-30 DIAGNOSIS — I10 ESSENTIAL HYPERTENSION: ICD-10-CM

## 2022-03-30 DIAGNOSIS — E66.01 MORBID OBESITY (HCC): ICD-10-CM

## 2022-03-30 DIAGNOSIS — E11.9 TYPE 2 DIABETES MELLITUS WITHOUT COMPLICATION, WITHOUT LONG-TERM CURRENT USE OF INSULIN (HCC): Primary | ICD-10-CM

## 2022-03-30 DIAGNOSIS — Z85.43 HISTORY OF OVARIAN CANCER: ICD-10-CM

## 2022-03-30 DIAGNOSIS — F41.1 GAD (GENERALIZED ANXIETY DISORDER): ICD-10-CM

## 2022-03-30 DIAGNOSIS — M35.00 SICCA, UNSPECIFIED TYPE (HCC): ICD-10-CM

## 2022-03-30 PROCEDURE — 99215 OFFICE O/P EST HI 40 MIN: CPT | Performed by: FAMILY MEDICINE

## 2022-03-30 RX ORDER — VENLAFAXINE HYDROCHLORIDE 150 MG/1
CAPSULE, EXTENDED RELEASE ORAL
COMMUNITY
Start: 2022-02-16 | End: 2022-03-30
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
## 2022-03-30 NOTE — PATIENT INSTRUCTIONS
10% - bad control"> 10% - bad control,Hemoglobin A1c (HbA1c) greater than 10% indicating poor diabetic control,Haemoglobin A1c greater than 10% indicating poor diabetic control">   Diabetes Mellitus Type 2 in Adults, Ambulatory Care   GENERAL INFORMATION:   Diabetes mellitus type 2  is a disease that affects how your body uses glucose (sugar)  Insulin helps move sugar out of the blood so it can be used for energy  Normally, when the blood sugar level increases, the pancreas makes more insulin  Type 2 diabetes develops because either the body cannot make enough insulin, or it cannot use the insulin correctly  After many years, your pancreas may stop making insulin  Common symptoms include the following:   · More hunger or thirst than usual     · Frequent urination     · Weight loss without trying     · Blurred vision  Seek immediate care for the following symptoms:   · Severe abdominal pain, or pain that spreads to your back  You may also be vomiting  · Trouble staying awake or focusing    · Shaking or sweating    · Blurred or double vision    · Breath has a fruity, sweet smell    · Breathing is deep and labored, or rapid and shallow    · Heartbeat is fast and weak  Treatment for diabetes mellitus type 2  includes keeping your blood sugar at a normal level  You must eat the right foods, and exercise regularly  You may also need medicine if you cannot control your blood sugar level with nutrition and exercise  Manage diabetes mellitus type 2:   · Check your blood sugar level  You will be taught how to check a small drop of blood in a glucose monitor  Ask your healthcare provider when and how often to check during the day  Ask your healthcare provider what your blood sugar levels should be when you check them  · Keep track of carbohydrates (sugar and starchy foods)  Your blood sugar level can get too high if you eat too many carbohydrates   Your dietitian will help you plan meals and snacks that have the right amount of carbohydrates  · Eat low-fat foods  Some examples are skinless chicken and low-fat milk  · Eat less sodium (salt)  Some examples of high-sodium foods to limit are soy sauce, potato chips, and soup  Do not add salt to food you cook  Limit your use of table salt  · Eat high-fiber foods  Foods that are a good source of fiber include vegetables, whole grain bread, and beans  · Limit alcohol  Alcohol affects your blood sugar level and can make it harder to manage your diabetes  Women should limit alcohol to 1 drink a day  Men should limit alcohol to 2 drinks a day  A drink of alcohol is 12 ounces of beer, 5 ounces of wine, or 1½ ounces of liquor  · Get regular exercise  Exercise can help keep your blood sugar level steady, decrease your risk of heart disease, and help you lose weight  Exercise for at least 30 minutes, 5 days a week  Include muscle strengthening activities 2 days each week  Work with your healthcare provider to create an exercise plan  · Check your feet each day  for injuries or open sores  Ask your healthcare provider for activities you can do if you have an open sore  · Quit smoking  If you smoke, it is never too late to quit  Smoking can worsen the problems that may occur with diabetes  Ask your healthcare provider for information about how to stop smoking if you are having trouble quitting  · Ask about your weight:  Ask healthcare providers if you need to lose weight, and how much to lose  Ask them to help you with a weight loss program  Even a 10 to 15 pound weight loss can help you manage your blood sugar level  · Carry medical alert identification  Wear medical alert jewelry or carry a card that says you have diabetes  Ask your healthcare provider where to get these items  · Ask about vaccines  Diabetes puts you at risk of serious illness if you get the flu, pneumonia, or hepatitis   Ask your healthcare provider if you should get a flu, pneumonia, or hepatitis B vaccine, and when to get the vaccine  Follow up with your healthcare provider as directed:  Write down your questions so you remember to ask them during your visits  CARE AGREEMENT:   You have the right to help plan your care  Learn about your health condition and how it may be treated  Discuss treatment options with your caregivers to decide what care you want to receive  You always have the right to refuse treatment  The above information is an  only  It is not intended as medical advice for individual conditions or treatments  Talk to your doctor, nurse or pharmacist before following any medical regimen to see if it is safe and effective for you  © 2014 [address] is for End User's use only and may not be sold, redistributed or otherwise used for commercial purposes  All illustrations and images included in CareNotes® are the copyrighted property of [employer]  or Gonzalo Donald  Basic Carbohydrate Counting   AMBULATORY CARE:   Carbohydrate counting  is a way to plan your meals by counting the amount of carbohydrate in foods  Carbohydrates are the sugars, starches, and fiber found in fruit, grains, vegetables, and milk products  Carbohydrates increase your blood sugar levels  Carbohydrate counting can help you eat the right amount of carbohydrate to keep your blood sugar levels under control  What you need to know about planning meals using carbohydrate counting:  · A dietitian or healthcare provider will help you develop a healthy meal plan that works best for you  You will be taught how much carbohydrate to eat or drink for each meal and snack  Your meal plan will be based on your age, weight, usual food intake, and physical activity level  If you have diabetes, it will also include your blood sugar levels and diabetes medicine   Once you know how much carbohydrate you should eat, you can decide what type of food you want to eat  · You will need to know what foods contain carbohydrate and how much they contain  Keep track of the amount of carbohydrate in meals and snacks in order to follow your meal plan  Do not avoid carbohydrates or skip meals  Your blood sugar may fall too low if you do not eat enough carbohydrate or you skip meals  Foods that contain carbohydrate:   · Breads:  Each serving of food listed below contains about 15 g of carbohydrate   ? 1 slice of bread (1 ounce) or 1 flour or corn tortilla (6 inch)    ? ½ of a hamburger bun or ¼ of a large bagel (about 1 ounce)    ? 1 pancake (about 4 inches across and ¼ inch thick)    · Cereals and grains:  Serving sizes of ready-to-eat cereals vary  Look at the serving size and the total carbohydrate amount listed on the food label  Each serving of food listed below contains about 15 g of carbohydrate   ? ¾ cup of dry, unsweetened, ready-to-eat cereal or ¼ cup of low-fat granola     ? ½ cup of oatmeal or other cooked cereal     ? ? cup of cooked rice or pasta    · Starchy vegetables and beans:  Each serving of food listed below contains about 15 g of carbohydrate   ? ½ cup of corn, green peas, sweet potatoes, or mashed potatoes    ? ¼ of a large baked potato    ? ½ cup of beans, lentils, and peas (garbanzo, parry, kidney, white, split, black-eyed)    · Crackers and snacks:  Each serving of food listed below contains about 15 g of carbohydrate   ? 3 patricia cracker squares or 8 animal crackers     ? 6 saltine-type crackers    ? 3 cups of popcorn or ¾ ounce of pretzels, potato chips, or tortilla chips    · Fruit:  Each serving of food listed below contains about 15 g of carbohydrate   ? 1 small (4 ounce) piece of fresh fruit or ¾ to 1 cup of fresh fruit    ? ½ cup of canned or frozen fruit, packed in natural juice    ? ½ cup (4 ounces) of unsweetened fruit juice    ?  2 tablespoons of dried fruit    · Desserts or sugary foods:  Each serving of food listed below contains about 15 g of carbohydrate   ? 2-inch square unfrosted cake or brownie     ? 2 small cookies    ? ½ cup of ice cream, frozen yogurt, or nondairy frozen yogurt    ? ¼ cup of sherbet or sorbet    ? 1 tablespoon of regular syrup, jam, or jelly    ? 2 tablespoons of light syrup    · Milk and yogurt:  Foods from the milk group contain about 12 g of carbohydrate per serving  ? 1 cup of fat-free or low-fat milk    ? 1 cup of soy milk    ? ? cup of fat-free, yogurt sweetened with artificial sweetener    · Non-starchy vegetables:  Each serving contains about 5 g of carbohydrate   Three servings of non-starch vegetables count as 1 carbohydrate serving  ? ½ cup of cooked vegetables or 1 cup of raw vegetables  This includes beets, broccoli, cabbage, cauliflower, cucumber, mushrooms, tomatoes, and zucchini    ? ½ cup of vegetable juice    How to use carbohydrate counting to plan meals:   · Count carbohydrate amounts using serving sizes:      ? Pasta dinner example: You plan to have pasta, tossed salad, and an 8-ounce glass of milk  Your healthcare provider tells you that you may have 4 carbohydrate servings for dinner  One carbohydrate serving of pasta is ? cup  One cup of pasta will equal 3 carbohydrate servings  An 8-ounce glass of milk will count as 1 carbohydrate serving  These amounts of food would equal 4 carbohydrate servings  One cup of tossed salad does not count toward your carbohydrate servings  · Count carbohydrate amounts using food labels:  Find the total amount of carbohydrate in a packaged food by reading the food label  Food labels tell you the serving size of the food and the total carbohydrate amount in each serving  Find the serving size on the food label and then decide how many servings you will eat  Multiply the number of servings you plan to eat by the carbohydrate amount per serving  ? Granola bar snack example:   Your meal plan allows you to have 2 carbohydrate servings (30 grams) of carbohydrate for a snack  You plan to eat 1 package of granola bars, which contains 2 bars  According to the food label, the serving size of food in this package is 1 bar  Each serving (1 bar) contains 25 grams of carbohydrate  The total amount of carbohydrate in this package of granola bars would be 50 g  Based on your meal plan, you should eat only 1 bar  Follow up with your doctor as directed:  Write down your questions so you remember to ask them during your visits  © Copyright [employer] 2022 Information is for End User's use only and may not be sold, redistributed or otherwise used for commercial purposes  All illustrations and images included in CareNotes® are the copyrighted property of [employer]  or [facility] Yoni Ron   The above information is an  only  It is not intended as medical advice for individual conditions or treatments  Talk to your doctor, nurse or pharmacist before following any medical regimen to see if it is safe and effective for you  Foot Care for People with Diabetes   AMBULATORY CARE:   What you need to know about foot care:   · Foot care helps protect your feet and prevent foot ulcers or sores  Long-term high blood sugar levels can damage the blood vessels and nerves in your legs and feet  This damage makes it hard to feel pressure, pain, temperature, and touch  You may not be able to feel a cut or sore, or shoes that are too tight  Foot care is needed to prevent serious problems, such as an infection or amputation  · Diabetes may cause your toes to become crooked or curved under  These changes may affect the way you walk and can lead to increased pressure on your foot  The pressure can decrease blood flow to your feet  Lack of blood flow increases your risk for a foot ulcer  Do not ignore small problems, such as dry skin or small wounds  These can become life-threatening over time without proper care      Call your care team provider if: · Your feet become numb, weak, or hard to move  · You have pus draining from a sore on your foot  · You have a wound on your foot that gets bigger, deeper, or does not heal      · You see blisters, cuts, scratches, calluses, or sores on your foot  · You have a fever, and your feet become red, warm, and swollen  · Your toenails become thick, curled, or yellow  · You find it hard to check your feet because your vision is poor  · You have questions or concerns about your condition or care  How to care for your feet:   · Check your feet each day  Look at your whole foot, including the bottom, and between and under your toes  Check for wounds, corns, and calluses  Use a mirror to see the bottom of your feet  The skin on your feet may be shiny, tight, or darker than normal  Your feet may also be cold and pale  Feel your feet by running your hands along the tops, bottoms, sides, and between your toes  Redness, swelling, and warmth are signs of blood flow problems that can lead to a foot ulcer  Do not try to remove corns or calluses yourself  · Wash your feet each day with soap and warm water  Do not use hot water, because this can injure your foot  Dry your feet gently with a towel after you wash them  Dry between and under your toes  · Apply lotion or a moisturizer on your dry feet  Ask your care team provider what lotions are best to use  Do not put lotion or moisturizer between your toes  Moisture between your toes could lead to skin breakdown  · Cut your toenails correctly  File or cut your toenails straight across  Use a soft brush to clean around your toenails  If your toenails are very thick, you may need to have a care team provider or specialist cut them  · Protect your feet  Do not walk barefoot or wear your shoes without socks  Check your shoes for rocks or other objects that can hurt your feet  Wear cotton socks to help keep your feet dry   Wear socks without toe seams, or wear them with the seams inside out  Change your socks each day  Do not wear socks that are dirty or damp  · Wear shoes that fit well  Wear shoes that do not rub against any area of your feet  Your shoes should be ½ to ¾ inch (1 to 2 centimeters) longer than your feet  Your shoes should also have extra space around the widest part of your feet  Walking or athletic shoes with laces or straps that adjust are best  Ask your care team provider for help to choose shoes that fit you best  Ask him or her if you need to wear an insert, orthotic, or bandage on your feet  · Go to your follow-up visits  Your care team provider will do a foot exam at least once a year  You may need a foot exam more often if you have nerve damage, foot deformities, or ulcers  He will check for nerve damage and how well you can feel your feet  He will check your shoes to see if they fit well  · Do not smoke  Smoking can damage your blood vessels and put you at increased risk for foot ulcers  Ask your care team provider for information if you currently smoke and need help to quit  E-cigarettes or smokeless tobacco still contain nicotine  Talk to your care team provider before you use these products  Follow up with your diabetes care team provider or foot specialist as directed: You will need to have your feet checked at least once a year  You may need a foot exam more often if you have nerve damage, foot deformities, or ulcers  Write down your questions so you remember to ask them during your visits  © Copyright [employer] 2022 Information is for End User's use only and may not be sold, redistributed or otherwise used for commercial purposes  All illustrations and images included in CareNotes® are the copyrighted property of [employer]  or Latisha Ron   The above information is an  only  It is not intended as medical advice for individual conditions or treatments   Talk to your doctor, nurse or pharmacist before following any medical regimen to see if it is safe and effective for you

## 2022-05-01 DIAGNOSIS — R60.0 LOCALIZED EDEMA: ICD-10-CM

## 2022-05-02 RX ORDER — SPIRONOLACTONE 100 MG/1
TABLET, FILM COATED ORAL
Qty: 90 TABLET | Refills: 0 | Status: SHIPPED | OUTPATIENT
Start: 2022-05-02 | End: 2022-07-29

## 2022-07-28 ENCOUNTER — RA CDI HCC (OUTPATIENT)
Dept: OTHER | Facility: HOSPITAL | Age: 57
End: 2022-07-28

## 2022-07-28 NOTE — PROGRESS NOTES
[address]  coding opportunities       Chart reviewed, no opportunity found: CHART REVIEWED, NO OPPORTUNITY FOUND        Patients Insurance     Medicare Insurance: Medicare

## 2022-07-29 DIAGNOSIS — R60.0 LOCALIZED EDEMA: ICD-10-CM

## 2022-07-29 RX ORDER — SPIRONOLACTONE 100 MG/1
TABLET, FILM COATED ORAL
Qty: 90 TABLET | Refills: 0 | Status: SHIPPED | OUTPATIENT
Start: 2022-07-29 | End: 2022-10-25

## 2022-09-27 LAB — HBA1C MFR BLD HPLC: 7.6 %

## 2022-10-07 ENCOUNTER — RA CDI HCC (OUTPATIENT)
Dept: OTHER | Facility: HOSPITAL | Age: 57
End: 2022-10-07

## 2022-10-07 NOTE — PROGRESS NOTES
[address]  coding opportunities          Chart Reviewed number of suggestions sent to Provider: 1   [id number]    Patients Insurance     Medicare Insurance: [employer]

## 2022-10-14 ENCOUNTER — OFFICE VISIT (OUTPATIENT)
Dept: FAMILY MEDICINE CLINIC | Facility: CLINIC | Age: 57
End: 2022-10-14
Payer: MEDICARE

## 2022-10-14 VITALS
WEIGHT: 274 LBS | RESPIRATION RATE: 16 BRPM | OXYGEN SATURATION: 97 % | DIASTOLIC BLOOD PRESSURE: 80 MMHG | TEMPERATURE: 98.6 F | HEIGHT: 62 IN | SYSTOLIC BLOOD PRESSURE: 130 MMHG | HEART RATE: 85 BPM | BODY MASS INDEX: 50.42 KG/M2

## 2022-10-14 DIAGNOSIS — E55.9 VITAMIN D DEFICIENCY: ICD-10-CM

## 2022-10-14 DIAGNOSIS — M79.7 FIBROMYALGIA: ICD-10-CM

## 2022-10-14 DIAGNOSIS — I10 ESSENTIAL HYPERTENSION: ICD-10-CM

## 2022-10-14 DIAGNOSIS — E66.01 MORBID OBESITY (HCC): ICD-10-CM

## 2022-10-14 DIAGNOSIS — E11.65 TYPE 2 DIABETES MELLITUS WITH HYPERGLYCEMIA, WITHOUT LONG-TERM CURRENT USE OF INSULIN (HCC): ICD-10-CM

## 2022-10-14 DIAGNOSIS — Z85.43 HISTORY OF OVARIAN CANCER: ICD-10-CM

## 2022-10-14 DIAGNOSIS — Z23 ENCOUNTER FOR IMMUNIZATION: Primary | ICD-10-CM

## 2022-10-14 DIAGNOSIS — E11.9 TYPE 2 DIABETES MELLITUS WITHOUT COMPLICATION, WITHOUT LONG-TERM CURRENT USE OF INSULIN (HCC): ICD-10-CM

## 2022-10-14 DIAGNOSIS — E53.8 CYANOCOBALAMIN DEFICIENCY: ICD-10-CM

## 2022-10-14 DIAGNOSIS — E79.0 ELEVATED URIC ACID IN BLOOD: ICD-10-CM

## 2022-10-14 DIAGNOSIS — C56.1 OVARIAN CA, RIGHT (HCC): ICD-10-CM

## 2022-10-14 DIAGNOSIS — F41.1 GAD (GENERALIZED ANXIETY DISORDER): ICD-10-CM

## 2022-10-14 DIAGNOSIS — E78.5 DYSLIPIDEMIA: ICD-10-CM

## 2022-10-14 DIAGNOSIS — I89.0 LYMPHEDEMA: ICD-10-CM

## 2022-10-14 DIAGNOSIS — G47.33 OSA (OBSTRUCTIVE SLEEP APNEA): ICD-10-CM

## 2022-10-14 PROCEDURE — 99215 OFFICE O/P EST HI 40 MIN: CPT

## 2022-10-14 PROCEDURE — 90682 RIV4 VACC RECOMBINANT DNA IM: CPT

## 2022-10-14 PROCEDURE — G0008 ADMIN INFLUENZA VIRUS VAC: HCPCS

## 2022-10-14 RX ORDER — LANOLIN ALCOHOL/MO/W.PET/CERES
2000 CREAM (GRAM) TOPICAL DAILY
Qty: 180 TABLET | Refills: 2 | Status: SHIPPED | OUTPATIENT
Start: 2022-10-14 | End: 2023-01-12

## 2022-10-14 RX ORDER — ACETAMINOPHEN 160 MG
2000 TABLET,DISINTEGRATING ORAL DAILY
Qty: 90 CAPSULE | Refills: 1 | Status: SHIPPED | OUTPATIENT
Start: 2022-10-14

## 2022-10-14 RX ORDER — METFORMIN HYDROCHLORIDE 500 MG/1
500 TABLET, EXTENDED RELEASE ORAL
Qty: 30 TABLET | Refills: 2 | Status: SHIPPED | OUTPATIENT
Start: 2022-10-14 | End: 2022-11-13

## 2022-10-14 RX ORDER — LANCETS 33 GAUGE
EACH MISCELLANEOUS
Qty: 100 EACH | Refills: 3 | Status: SHIPPED | OUTPATIENT
Start: 2022-10-14

## 2022-10-14 RX ORDER — BLOOD SUGAR DIAGNOSTIC
STRIP MISCELLANEOUS
Qty: 100 EACH | Refills: 3 | Status: SHIPPED | OUTPATIENT
Start: 2022-10-14

## 2022-10-14 RX ORDER — BLOOD-GLUCOSE METER
KIT MISCELLANEOUS
Qty: 1 KIT | Refills: 0 | Status: SHIPPED | OUTPATIENT
Start: 2022-10-14

## 2022-10-14 NOTE — PROGRESS NOTES
Name: Lui Muhammad      : 1965      MRN: [id number]  Encounter Provider: Jefferson Partida MD  Encounter Date: 10/14/2022   Encounter department: [address] [place] [address]       Discussed blood test results patient detail LDL is 139 triglycerides 167 A1c 7 6 B12 443 low vitamin-D is low at 24 uric acid elevated at 7 2 she has never had gout before  Mammograms normal   Patient advised to start on metformin daily to decrease her risk of diabetes severe and metformin research  show can decrease risk of ovarian cancer  Advised to tart cherry supplementation to lower her uric acid level  Patient asked about fenofibrate to help lower her diabetes and cholesterol  Patient advised right now will need to lower her blood sugar 1st and then we can consider lower her cholesterol by a statin since fenofibrate can elevate her LDL  Supplementation B12 vitamin-D level  Flu vaccine given today  Advised for Tdap and shingle vaccine the pharmacy  Will see back in 3 months follow-up A1c in the office  At recommendation for diabetic teaching she refused  Diabetic foot exam done today  Advised a diabetic eye exam     I have spent 40 minutes with Patient  today in which greater than 50% of this time was spent in counseling/coordination of care regarding Diagnostic results, Prognosis, Risks and benefits of tx options, Intructions for management and Patient and family education  1  Encounter for immunization  -     influenza vaccine, quadrivalent, recombinant, PF, 0 5 mL, for patients 18 yr+ (FLUBLOK)    2  Type 2 diabetes mellitus without complication, without long-term current use of insulin (HCC)  -     metFORMIN (GLUCOPHAGE-XR) 500 mg 24 hr tablet; Take 1 tablet (500 mg total) by mouth daily with dinner  -     Blood Glucose Monitoring Suppl (OneTouch Verio Reflect) w/Device KIT; Check blood sugars once daily   Please substitute with appropriate alternative as covered by patient's insurance  Dx: E11 65  -     glucose blood (OneTouch Verio) test strip; Check blood sugars once daily  Please substitute with appropriate alternative as covered by patient's insurance  Dx: E11 65  -     OneTouch Delica Lancets 16G MISC; Check blood sugars once daily  Please substitute with appropriate alternative as covered by patient's insurance  Dx: E11 65    3  MACRINA (obstructive sleep apnea)    4  Essential hypertension    5  HALLIE (generalized anxiety disorder)    6  Morbid obesity ([facility] [address] )    7  History of ovarian cancer    8  Lymphedema    9  Fibromyalgia    10  Cyanocobalamin deficiency  -     vitamin B-12 (VITAMIN B-12) 1,000 mcg tablet; Take 2 tablets (2,000 mcg total) by mouth daily    11  Vitamin D deficiency  -     Cholecalciferol (Vitamin D3) 50 MCG (2000 UT) capsule; Take 1 capsule (2,000 Units total) by mouth daily    12  Elevated uric acid in blood    13  Dyslipidemia    14  Ovarian CA, right ([facility] [address] )    15  Type 2 diabetes mellitus with hyperglycemia, without long-term current use of insulin (HCC)         Subjective      42-year-old female follow-up type 2 diabetes  A1c was 6 8 last time she did not want to start medication she want to try on her own exercise diet weight loss however nothing changed, now  A1c came back to 7 6  She has history of ovarian cancer in 2015 now she is in remission  She is unable to lose weight  She stop taking her venlafaxine because her mood is stable  Mammogram was normal   Colonoscopy was 2015 that was normal next 2025  Patient is on permanent disability need paperwork filled out yearly  She follow gyn oncologist   She is taking spironolactone for edema and aspirin to prevent blood clot  Review of Systems   Constitutional: Negative for activity change, appetite change, fatigue and unexpected weight change  HENT: Negative for ear pain, sore throat, trouble swallowing and voice change  Eyes: Negative for photophobia and visual disturbance     Respiratory: Negative for cough, chest tightness, shortness of breath and wheezing  Cardiovascular: Negative for chest pain, palpitations and leg swelling  Gastrointestinal: Negative for abdominal pain, constipation, diarrhea, nausea, rectal pain and vomiting  Endocrine: Negative for cold intolerance, polydipsia and polyuria  Genitourinary: Negative for difficulty urinating, dysuria, flank pain, menstrual problem and pelvic pain  Musculoskeletal: Negative for arthralgias, joint swelling and myalgias  Skin: Negative for color change and rash  Allergic/Immunologic: Negative for environmental allergies and immunocompromised state  Neurological: Negative for dizziness, weakness, numbness and headaches  Hematological: Negative for adenopathy  Does not bruise/bleed easily  Psychiatric/Behavioral: Negative for decreased concentration, dysphoric mood, self-injury, sleep disturbance and suicidal ideas  The patient is not nervous/anxious          Current Outpatient Medications on File Prior to Visit   Medication Sig   • acetaminophen (TYLENOL) 325 mg tablet Take 325 mg by mouth daily as needed     • aspirin 81 mg chewable tablet Chew 81 mg daily    • DAILY MULTIPLE VITAMINS PO Take 1 tablet by mouth daily   • desonide (DESOWEN) 0 05 % cream Apply topically 2 (two) times a day   • mupirocin (BACTROBAN) 2 % ointment Apply topically 3 (three) times a day   • nystatin (MYCOSTATIN) powder    • spironolactone (ALDACTONE) 100 mg tablet TAKE ONE TABLET BY MOUTH IN THE MORNING   • Diphenhydramine-APAP, sleep, (TYLENOL PM EXTRA STRENGTH)  MG/30ML LIQD Take by mouth as needed  (Patient not taking: Reported on 9/30/2021)   • venlafaxine 225 MG TB24 Take 1 tablet by mouth daily  (Patient not taking: Reported on 3/30/2022 )   • [DISCONTINUED] Cholecalciferol (VITAMIN D3) 5000 units CAPS Take 5,000 Units by mouth daily        Objective     /80 (BP Location: Left arm, Patient Position: Sitting, Cuff Size: Large)   Pulse 85 Temp 98 6 °F (37 °C) (Temporal)   Resp 16   Ht 5' 2" (1 575 m)   Wt 124 kg (274 lb)   SpO2 97%   BMI 50 12 kg/m²     Physical Exam  Vitals and nursing note reviewed  Constitutional:       Appearance: Normal appearance  She is well-developed  She is obese  HENT:      Head: Normocephalic and atraumatic  Right Ear: Tympanic membrane, ear canal and external ear normal       Left Ear: Tympanic membrane, ear canal and external ear normal       Nose: Nose normal       Mouth/Throat:      Mouth: Mucous membranes are moist       Pharynx: Oropharynx is clear  No oropharyngeal exudate  Eyes:      Extraocular Movements: Extraocular movements intact  Pupils: Pupils are equal, round, and reactive to light  Neck:      Thyroid: No thyromegaly  Cardiovascular:      Rate and Rhythm: Normal rate and regular rhythm  Pulses: no weak pulses          Dorsalis pedis pulses are 2+ on the right side and 2+ on the left side  Posterior tibial pulses are 1+ on the right side and 1+ on the left side  Heart sounds: Normal heart sounds  No murmur heard  Pulmonary:      Effort: Pulmonary effort is normal  No respiratory distress  Breath sounds: Normal breath sounds  No wheezing or rales  Abdominal:      General: Abdomen is flat  Bowel sounds are normal  There is no distension  Palpations: Abdomen is soft  Tenderness: There is no abdominal tenderness  There is no guarding  Genitourinary:     Vagina: Normal    Musculoskeletal:         General: No tenderness  Normal range of motion  Cervical back: Normal range of motion and neck supple  Feet:      Right foot:      Skin integrity: No ulcer, skin breakdown, erythema, warmth, callus or dry skin  Left foot:      Skin integrity: No ulcer, skin breakdown, erythema, warmth, callus or dry skin  Skin:     General: Skin is warm and dry  Capillary Refill: Capillary refill takes less than 2 seconds  Findings: No rash  Neurological:      General: No focal deficit present  Mental Status: She is alert and oriented to person, place, and time  Mental status is at baseline  Psychiatric:         Mood and Affect: Mood normal          Behavior: Behavior normal          Thought Content: Thought content normal          Judgment: Judgment normal      Patient's shoes and socks removed  Right Foot/Ankle   Right Foot Inspection  Skin Exam: skin normal and skin intact  No dry skin, no warmth, no callus, no erythema, no maceration, no abnormal color, no pre-ulcer, no ulcer and no callus  Toe Exam: ROM and strength within normal limits  Sensory   Vibration: intact  Proprioception: intact  Monofilament testing: intact    Vascular  Capillary refills: < 3 seconds  The right DP pulse is 2+  The right PT pulse is 1+  Right Toe  - Comprehensive Exam  Ecchymosis: none  Arch: normal  Hammertoes: absent  Claw Toes: absent  Swelling: none   Tenderness: none         Left Foot/Ankle  Left Foot Inspection  Skin Exam: skin normal and skin intact  No dry skin, no warmth, no erythema, no maceration, normal color, no pre-ulcer, no ulcer and no callus  Toe Exam: ROM and strength within normal limits  Sensory   Vibration: intact  Proprioception: intact  Monofilament testing: intact    Vascular  Capillary refills: < 3 seconds  The left DP pulse is 2+  The left PT pulse is 1+       Left Toe  - Comprehensive Exam  Ecchymosis: none  Arch: normal  Hammertoes: absent  Claw toes: absent  Swelling: none   Tenderness: none           Assign Risk Category  No deformity present  No loss of protective sensation  No weak pulses  Risk: 0    Hanh-Dung Haze Nissen, MD

## 2022-10-14 NOTE — PATIENT INSTRUCTIONS
Type 2 Diabetes Management for Adults   AMBULATORY CARE:   Type 2 diabetes  is a disease that affects how your body uses glucose (sugar)  Either your body cannot make enough insulin, or it cannot use the insulin correctly  It is important to keep diabetes controlled to prevent damage to your heart, blood vessels, and other organs  Have someone call your local emergency number (911 in the [address], located within [facility] [facility]) if:   · You cannot be woken  · You have signs of diabetic ketoacidosis:     ? confusion, fatigue    ? vomiting    ? rapid heartbeat    ? fruity smelling breath    ? extreme thirst    ? dry mouth and skin    · You have any of the following signs of a heart attack:      ? Squeezing, pressure, or pain in your chest    ? You may  also have any of the following:     § Discomfort or pain in your back, neck, jaw, stomach, or arm    § Shortness of breath    § Nausea or vomiting    § Lightheadedness or a sudden cold sweat    · You have any of the following signs of a stroke:      ? Numbness or drooping on one side of your face     ? Weakness in an arm or leg    ? Confusion or difficulty speaking    ? Dizziness, a severe headache, or vision loss    Call your doctor or diabetes care team if:   · You have a sore or wound that will not heal     · You have a change in the amount you urinate  · Your blood sugar levels are higher than your target goals  · You often have lower blood sugar levels than your target goals  · Your skin is red, dry, warm, or swollen  · You have trouble coping with diabetes, or you feel anxious or depressed  · You have questions or concerns about your condition or care  What you need to know about high blood sugar levels:  High blood sugar levels may not cause any symptoms  You may feel more thirsty or urinate more often than usual  Over time, high blood sugar levels can damage your nerves, blood vessels, tissues, and organs   The following can increase your blood sugar levels:  · Large meals or large amounts of carbohydrates at one time    · Less physical activity    · Stress    · Illness    · A lower dose of medicine or insulin, or a late dose    What you need to know about low blood sugar levels: You can prevent symptoms such as shakiness, dizziness, irritability, or confusion by preventing your blood sugar levels from going too low  · Treat a low blood sugar level right away  ? Drink 4 ounces of juice or have 1 tube of glucose gel  ? Check your blood sugar level again 10 to 15 minutes later  ? When the level goes back to normal, eat a meal or snack to prevent another decrease  · Keep glucose gel, raisins, or hard candy with you at all times to treat a low blood sugar level  · Your blood sugar level can get too low if you take diabetes medicine or insulin and do not eat enough food  · If you use insulin, check your blood sugar level before you exercise  ? If your blood sugar level is below 100 mg/dL, eat 4 crackers or 2 ounces of raisins, or drink 4 ounces of juice  ? Check your level every 30 minutes if you exercise more than 1 hour  ? You may need a snack during or after exercise  What you can do to manage your blood sugar levels:   · Check your blood sugar levels as directed and as needed  Several items are available to use to check your levels  You may need to check by testing a drop of blood in a glucose monitor  You may instead be given a continuous glucose monitoring (CGM) device  The device is worn at all times  The CGM checks your blood sugar level every 5 minutes  It sends results to an electronic device such as a smart phone  A CGM can be used with or without an insulin pump  Talk with your provider to find out which method is best for you  The goal for blood sugar levels before meals  is between 80 and 130 mg/dL and 2 hours after eating  is lower than 180 mg/dL  · Make healthy food choices    Work with a dietitian to develop a meal plan that works for you and your schedule  A dietitian can help you learn how to eat the right amount of carbohydrates during your meals and snacks  Carbohydrates can raise your blood sugar level if you eat too many at one time  Examples of foods that contain carbohydrates are breads, cereals, rice, pasta, and sweets  · Get regular physical activity  Physical activity can help you get to your target blood sugar level goal and manage your weight  Get at least 150 minutes of moderate to vigorous aerobic physical activity each week  Do not miss more than 2 days in a row  Do not sit longer than 30 minutes at a time  Your healthcare provider can help you create an activity plan  The plan can include the best activities for you and can help you build your strength and endurance  · Maintain a healthy weight  Ask your healthcare provider what a healthy weight is for you  Ask him or her to help you create a safe weight loss plan if you are overweight  · Take your diabetes medicine or insulin as directed  You may need diabetes medicine, insulin, or both to help control your blood sugar levels  Your healthcare provider will teach you how and when to take your diabetes medicine or insulin  You will also be taught about side effects oral diabetes medicine can cause  Insulin may be injected, or given through a pump or pen  You and your care team will discuss which method is best for you  ? An insulin pump  is an implanted device that gives your insulin 24 hours a day  An insulin pump prevents the need for multiple insulin injections in a day  ? An insulin pen  is a device prefilled with the right amount of insulin  ? You and your family members will be taught how to draw up and give insulin  if this is the best method for you  Your education team will also teach you how to dispose of needles and syringes  ? You will learn how much insulin you need  and when to give it   You will be taught when not to give insulin  You will also be taught what to do if your blood sugar level drops too low  This may happen if you take insulin and do not eat the right amount of carbohydrates  Other things you can do to manage type 2 diabetes:   · Wear medical alert identification  Wear medical alert jewelry or carry a card that says you have diabetes  Ask your provider where to get these items  · Do not smoke  Nicotine and other chemicals in cigarettes and cigars can cause lung and blood vessel damage  It also makes it more difficult to manage your diabetes  Ask your provider for information if you currently smoke and need help to quit  Do not use e-cigarettes or smokeless tobacco in place of cigarettes or to help you quit  They still contain nicotine  · Check your feet each day for cuts, scratches, calluses, or other wounds  Look for redness and swelling, and feel for warmth  Wear shoes that fit well  Check your shoes for rocks or other objects that can hurt your feet  Do not walk barefoot or wear shoes without socks  Wear cotton socks to help keep your feet dry  · Ask about vaccines you may need  You have a higher risk for serious illness if you get the flu, pneumonia, COVID-19, or hepatitis  Ask your provider if you should get vaccines to prevent these or other diseases, and when to get the vaccines  · Talk to your care team if you become stressed about diabetes care  Sometimes being able to fit diabetes care into your life can cause increased stress  The stress can cause you not to take care of yourself properly  Your care team can help by offering tips about self-care  Your care team may suggest you talk to a mental health provider  The provider can listen and offer help with self-care issues  Follow up with your doctor or diabetes care team as directed: You may need to have blood tests done before your follow-up visit   The test results will show if changes need to be made in your treatment or self-care  Write down your questions so you remember to ask them during your visits  Talk to your provider if you cannot afford your medicine  © Copyright [employer] 2022 Information is for End User's use only and may not be sold, redistributed or otherwise used for commercial purposes  All illustrations and images included in CareNotes® are the copyrighted property of [employer]  or Latisha Ron   The above information is an  only  It is not intended as medical advice for individual conditions or treatments  Talk to your doctor, nurse or pharmacist before following any medical regimen to see if it is safe and effective for you  Basic Carbohydrate Counting   AMBULATORY CARE:   Carbohydrate counting  is a way to plan your meals by counting the amount of carbohydrate in foods  Carbohydrates are the sugars, starches, and fiber found in fruit, grains, vegetables, and milk products  Carbohydrates increase your blood sugar levels  Carbohydrate counting can help you eat the right amount of carbohydrate to keep your blood sugar levels under control  What you need to know about planning meals using carbohydrate counting:  · A dietitian or healthcare provider will help you develop a healthy meal plan that works best for you  You will be taught how much carbohydrate to eat or drink for each meal and snack  Your meal plan will be based on your age, weight, usual food intake, and physical activity level  If you have diabetes, it will also include your blood sugar levels and diabetes medicine  Once you know how much carbohydrate you should eat, you can decide what type of food you want to eat  · You will need to know what foods contain carbohydrate and how much they contain  Keep track of the amount of carbohydrate in meals and snacks in order to follow your meal plan  Do not avoid carbohydrates or skip meals   Your blood sugar may fall too low if you do not eat enough carbohydrate or you skip meals  Foods that contain carbohydrate:   · Breads:  Each serving of food listed below contains about 15 g of carbohydrate   ? 1 slice of bread (1 ounce) or 1 flour or corn tortilla (6 inch)    ? ½ of a hamburger bun or ¼ of a large bagel (about 1 ounce)    ? 1 pancake (about 4 inches across and ¼ inch thick)    · Cereals and grains:  Serving sizes of ready-to-eat cereals vary  Look at the serving size and the total carbohydrate amount listed on the food label  Each serving of food listed below contains about 15 g of carbohydrate   ? ¾ cup of dry, unsweetened, ready-to-eat cereal or ¼ cup of low-fat granola     ? ½ cup of oatmeal or other cooked cereal     ? ? cup of cooked rice or pasta    · Starchy vegetables and beans:  Each serving of food listed below contains about 15 g of carbohydrate   ? ½ cup of corn, green peas, sweet potatoes, or mashed potatoes    ? ¼ of a large baked potato    ? ½ cup of beans, lentils, and peas (garbanzo, parry, kidney, white, split, black-eyed)    · Crackers and snacks:  Each serving of food listed below contains about 15 g of carbohydrate   ? 3 patricia cracker squares or 8 animal crackers     ? 6 saltine-type crackers    ? 3 cups of popcorn or ¾ ounce of pretzels, potato chips, or tortilla chips    · Fruit:  Each serving of food listed below contains about 15 g of carbohydrate   ? 1 small (4 ounce) piece of fresh fruit or ¾ to 1 cup of fresh fruit    ? ½ cup of canned or frozen fruit, packed in natural juice    ? ½ cup (4 ounces) of unsweetened fruit juice    ? 2 tablespoons of dried fruit    · Desserts or sugary foods:  Each serving of food listed below contains about 15 g of carbohydrate   ? 2-inch square unfrosted cake or brownie     ? 2 small cookies    ? ½ cup of ice cream, frozen yogurt, or nondairy frozen yogurt    ? ¼ cup of sherbet or sorbet    ? 1 tablespoon of regular syrup, jam, or jelly    ?  2 tablespoons of light syrup    · Milk and yogurt:  Foods from the milk group contain about 12 g of carbohydrate per serving  ? 1 cup of fat-free or low-fat milk    ? 1 cup of soy milk    ? ? cup of fat-free, yogurt sweetened with artificial sweetener    · Non-starchy vegetables:  Each serving contains about 5 g of carbohydrate   Three servings of non-starch vegetables count as 1 carbohydrate serving  ? ½ cup of cooked vegetables or 1 cup of raw vegetables  This includes beets, broccoli, cabbage, cauliflower, cucumber, mushrooms, tomatoes, and zucchini    ? ½ cup of vegetable juice    How to use carbohydrate counting to plan meals:   · Count carbohydrate amounts using serving sizes:      ? Pasta dinner example: You plan to have pasta, tossed salad, and an 8-ounce glass of milk  Your healthcare provider tells you that you may have 4 carbohydrate servings for dinner  One carbohydrate serving of pasta is ? cup  One cup of pasta will equal 3 carbohydrate servings  An 8-ounce glass of milk will count as 1 carbohydrate serving  These amounts of food would equal 4 carbohydrate servings  One cup of tossed salad does not count toward your carbohydrate servings  · Count carbohydrate amounts using food labels:  Find the total amount of carbohydrate in a packaged food by reading the food label  Food labels tell you the serving size of the food and the total carbohydrate amount in each serving  Find the serving size on the food label and then decide how many servings you will eat  Multiply the number of servings you plan to eat by the carbohydrate amount per serving  ? Granola bar snack example: Your meal plan allows you to have 2 carbohydrate servings (30 grams) of carbohydrate for a snack  You plan to eat 1 package of granola bars, which contains 2 bars  According to the food label, the serving size of food in this package is 1 bar  Each serving (1 bar) contains 25 grams of carbohydrate   The total amount of carbohydrate in this package of granola bars would be 50 g  Based on your meal plan, you should eat only 1 bar  Follow up with your doctor as directed:  Write down your questions so you remember to ask them during your visits  © Copyright [employer] 2022 Information is for End User's use only and may not be sold, redistributed or otherwise used for commercial purposes  All illustrations and images included in CareNotes® are the copyrighted property of [employer]  or Latisha Mei  The above information is an  only  It is not intended as medical advice for individual conditions or treatments  Talk to your doctor, nurse or pharmacist before following any medical regimen to see if it is safe and effective for you  Foot Care for People with Diabetes   AMBULATORY CARE:   What you need to know about foot care:   · Foot care helps protect your feet and prevent foot ulcers or sores  Long-term high blood sugar levels can damage the blood vessels and nerves in your legs and feet  This damage makes it hard to feel pressure, pain, temperature, and touch  You may not be able to feel a cut or sore, or shoes that are too tight  Foot care is needed to prevent serious problems, such as an infection or amputation  · Diabetes may cause your toes to become crooked or curved under  These changes may affect the way you walk and can lead to increased pressure on your foot  The pressure can decrease blood flow to your feet  Lack of blood flow increases your risk for a foot ulcer  Do not ignore small problems, such as dry skin or small wounds  These can become life-threatening over time without proper care  Call your care team provider if:   · Your feet become numb, weak, or hard to move  · You have pus draining from a sore on your foot  · You have a wound on your foot that gets bigger, deeper, or does not heal      · You see blisters, cuts, scratches, calluses, or sores on your foot       · You have a fever, and your feet become red, warm, and swollen  · Your toenails become thick, curled, or yellow  · You find it hard to check your feet because your vision is poor  · You have questions or concerns about your condition or care  How to care for your feet:   · Check your feet each day  Look at your whole foot, including the bottom, and between and under your toes  Check for wounds, corns, and calluses  Use a mirror to see the bottom of your feet  The skin on your feet may be shiny, tight, or darker than normal  Your feet may also be cold and pale  Feel your feet by running your hands along the tops, bottoms, sides, and between your toes  Redness, swelling, and warmth are signs of blood flow problems that can lead to a foot ulcer  Do not try to remove corns or calluses yourself  · Wash your feet each day with soap and warm water  Do not use hot water, because this can injure your foot  Dry your feet gently with a towel after you wash them  Dry between and under your toes  · Apply lotion or a moisturizer on your dry feet  Ask your care team provider what lotions are best to use  Do not put lotion or moisturizer between your toes  Moisture between your toes could lead to skin breakdown  · Cut your toenails correctly  File or cut your toenails straight across  Use a soft brush to clean around your toenails  If your toenails are very thick, you may need to have a care team provider or specialist cut them  · Protect your feet  Do not walk barefoot or wear your shoes without socks  Check your shoes for rocks or other objects that can hurt your feet  Wear cotton socks to help keep your feet dry  Wear socks without toe seams, or wear them with the seams inside out  Change your socks each day  Do not wear socks that are dirty or damp  · Wear shoes that fit well  Wear shoes that do not rub against any area of your feet   Your shoes should be ½ to ¾ inch (1 to 2 centimeters) longer than your feet  Your shoes should also have extra space around the widest part of your feet  Walking or athletic shoes with laces or straps that adjust are best  Ask your care team provider for help to choose shoes that fit you best  Ask him or her if you need to wear an insert, orthotic, or bandage on your feet  · Go to your follow-up visits  Your care team provider will do a foot exam at least once a year  You may need a foot exam more often if you have nerve damage, foot deformities, or ulcers  He will check for nerve damage and how well you can feel your feet  He will check your shoes to see if they fit well  · Do not smoke  Smoking can damage your blood vessels and put you at increased risk for foot ulcers  Ask your care team provider for information if you currently smoke and need help to quit  E-cigarettes or smokeless tobacco still contain nicotine  Talk to your care team provider before you use these products  Follow up with your diabetes care team provider or foot specialist as directed: You will need to have your feet checked at least once a year  You may need a foot exam more often if you have nerve damage, foot deformities, or ulcers  Write down your questions so you remember to ask them during your visits  © Copyright [employer] 2022 Information is for End User's use only and may not be sold, redistributed or otherwise used for commercial purposes  All illustrations and images included in CareNotes® are the copyrighted property of [employer]  or Latisha Mei  The above information is an  only  It is not intended as medical advice for individual conditions or treatments  Talk to your doctor, nurse or pharmacist before following any medical regimen to see if it is safe and effective for you

## 2022-10-25 DIAGNOSIS — R60.0 LOCALIZED EDEMA: ICD-10-CM

## 2022-10-25 RX ORDER — SPIRONOLACTONE 100 MG/1
TABLET, FILM COATED ORAL
Qty: 90 TABLET | Refills: 0 | Status: SHIPPED | OUTPATIENT
Start: 2022-10-25

## 2022-12-01 DIAGNOSIS — E11.65 TYPE 2 DIABETES MELLITUS WITH HYPERGLYCEMIA, WITHOUT LONG-TERM CURRENT USE OF INSULIN (HCC): Primary | ICD-10-CM

## 2022-12-01 RX ORDER — FLASH GLUCOSE SCANNING READER
EACH MISCELLANEOUS
Qty: 4 EACH | Refills: 3 | Status: SHIPPED | OUTPATIENT
Start: 2022-12-01

## 2022-12-01 RX ORDER — FLASH GLUCOSE SENSOR
KIT MISCELLANEOUS
Qty: 4 EACH | Refills: 3 | Status: SHIPPED | OUTPATIENT
Start: 2022-12-01

## 2022-12-15 ENCOUNTER — TELEMEDICINE (OUTPATIENT)
Dept: DIABETES SERVICES | Facility: HOSPITAL | Age: 57
End: 2022-12-15

## 2022-12-15 VITALS — HEIGHT: 62 IN | BODY MASS INDEX: 50.61 KG/M2 | WEIGHT: 275 LBS

## 2022-12-15 DIAGNOSIS — E11.65 TYPE 2 DIABETES MELLITUS WITH HYPERGLYCEMIA, WITHOUT LONG-TERM CURRENT USE OF INSULIN (HCC): Primary | ICD-10-CM
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
## 2023-01-06 DIAGNOSIS — E11.9 TYPE 2 DIABETES MELLITUS WITHOUT COMPLICATION, WITHOUT LONG-TERM CURRENT USE OF INSULIN (HCC): ICD-10-CM

## 2023-01-06 RX ORDER — METFORMIN HYDROCHLORIDE 500 MG/1
TABLET, EXTENDED RELEASE ORAL
Qty: 30 TABLET | Refills: 0 | Status: SHIPPED | OUTPATIENT
Start: 2023-01-06

## 2023-01-19 ENCOUNTER — RA CDI HCC (OUTPATIENT)
Dept: OTHER | Facility: HOSPITAL | Age: 58
End: 2023-01-19

## 2023-01-24 DIAGNOSIS — R60.0 LOCALIZED EDEMA: ICD-10-CM

## 2023-01-24 RX ORDER — SPIRONOLACTONE 100 MG/1
TABLET, FILM COATED ORAL
Qty: 90 TABLET | Refills: 0 | Status: SHIPPED | OUTPATIENT
Start: 2023-01-24 | End: 2023-01-26 | Stop reason: SDUPTHER

## 2023-01-26 ENCOUNTER — OFFICE VISIT (OUTPATIENT)
Dept: FAMILY MEDICINE CLINIC | Facility: CLINIC | Age: 58
End: 2023-01-26

## 2023-01-26 VITALS
HEIGHT: 62 IN | RESPIRATION RATE: 16 BRPM | SYSTOLIC BLOOD PRESSURE: 130 MMHG | OXYGEN SATURATION: 98 % | TEMPERATURE: 98.6 F | HEART RATE: 72 BPM | BODY MASS INDEX: 50.2 KG/M2 | DIASTOLIC BLOOD PRESSURE: 80 MMHG | WEIGHT: 272.8 LBS

## 2023-01-26 DIAGNOSIS — E66.01 MORBID OBESITY (HCC): ICD-10-CM

## 2023-01-26 DIAGNOSIS — E11.65 TYPE 2 DIABETES MELLITUS WITH HYPERGLYCEMIA, WITHOUT LONG-TERM CURRENT USE OF INSULIN (HCC): Primary | ICD-10-CM

## 2023-01-26 DIAGNOSIS — R60.0 LOCALIZED EDEMA: ICD-10-CM

## 2023-01-26 DIAGNOSIS — C56.1 OVARIAN CA, RIGHT (HCC): ICD-10-CM

## 2023-01-26 DIAGNOSIS — I10 ESSENTIAL HYPERTENSION: ICD-10-CM

## 2023-01-26 DIAGNOSIS — E11.9 TYPE 2 DIABETES MELLITUS WITHOUT COMPLICATION, WITHOUT LONG-TERM CURRENT USE OF INSULIN (HCC): ICD-10-CM

## 2023-01-26 DIAGNOSIS — E78.5 DYSLIPIDEMIA: ICD-10-CM

## 2023-01-26 DIAGNOSIS — E79.0 ELEVATED URIC ACID IN BLOOD: ICD-10-CM

## 2023-01-26 DIAGNOSIS — F41.1 GAD (GENERALIZED ANXIETY DISORDER): ICD-10-CM

## 2023-01-26 DIAGNOSIS — G47.33 OSA (OBSTRUCTIVE SLEEP APNEA): ICD-10-CM

## 2023-01-26 LAB — SL AMB POCT HEMOGLOBIN AIC: 7.1 (ref ?–6.5)

## 2023-01-26 RX ORDER — SPIRONOLACTONE 100 MG/1
100 TABLET, FILM COATED ORAL EVERY MORNING
Qty: 90 TABLET | Refills: 3 | Status: SHIPPED | OUTPATIENT
Start: 2023-01-26

## 2023-01-26 RX ORDER — METFORMIN HYDROCHLORIDE 500 MG/1
500 TABLET, EXTENDED RELEASE ORAL
Qty: 90 TABLET | Refills: 1 | Status: SHIPPED | OUTPATIENT
Start: 2023-01-26

## 2023-01-26 NOTE — PROGRESS NOTES
Name: Klever Pantoja      : 1965      MRN: [id number]  Encounter Provider: Krishan Jarrell MD  Encounter Date: 2023   Encounter department: [address] [place] [address]       Discussed blood test results patient detail LDL is 139 triglycerides 167 A1c 7 6 B12 443 low vitamin-D is low at 24 uric acid elevated at 7 2 she has never had gout before  Mammograms normal   Patient advised to start on metformin daily to decrease her risk of diabetes severe and metformin research  show can decrease risk of ovarian cancer  Advised to tart cherry supplementation to lower her uric acid level  Patient asked about fenofibrate to help lower her diabetes and cholesterol  Patient advised right now will need to lower her blood sugar 1st and then we can consider lower her cholesterol by a statin since fenofibrate can elevate her LDL  Supplementation B12 vitamin-D level  Flu vaccine given today  Advised for Tdap and shingle vaccine the pharmacy  Will see back in 3 months follow-up A1c in the office  Continue metformin 500 mg daily  Next visit if hba1c <7 will remain same  If not will increase dose to 1000 mg daily    I have spent 40 minutes with Patient  today in which greater than 50% of this time was spent in counseling/coordination of care regarding Diagnostic results, Prognosis, Risks and benefits of tx options, Intructions for management and Patient and family education  1  Type 2 diabetes mellitus with hyperglycemia, without long-term current use of insulin (HCC)  -     POCT hemoglobin A1c    2  Localized edema  -     spironolactone (ALDACTONE) 100 mg tablet; Take 1 tablet (100 mg total) by mouth every morning    3  Ovarian CA, right ([address] )    4  MACRINA (obstructive sleep apnea)    5  Essential hypertension    6  HALLIE (generalized anxiety disorder)    7  Elevated uric acid in blood    8  Dyslipidemia    9   Type 2 diabetes mellitus without complication, without long-term current use of insulin (HCC)  -     metFORMIN (GLUCOPHAGE-XR) 500 mg 24 hr tablet; Take 1 tablet (500 mg total) by mouth daily with dinner    10  Morbid obesity ([address] )         Subjective      63-year-old female follow-up type 2 diabetes  A1c was 6 8 last time she did not want to start medication she want to try on her own exercise diet weight loss however nothing changed, now  A1c came back to 7 6  She has history of ovarian cancer in 2015 now she is in remission  She is unable to lose weight  She stop taking her venlafaxine because her mood is stable  Mammogram was normal   Colonoscopy was 2015 that was normal next 2025  Patient is on permanent disability need paperwork filled out yearly  She follow gyn oncologist   She is taking spironolactone for edema and aspirin to prevent blood clot  Pt has been taking metformin  mg daily for past 6 weeks, no s/e  Blood sugar range 60 to 150  hba1c in office today 7 1  Has freestyle klarissa    Review of Systems   Constitutional: Negative for activity change, appetite change, fatigue and unexpected weight change  HENT: Negative for ear pain, sore throat, trouble swallowing and voice change  Eyes: Negative for photophobia and visual disturbance  Respiratory: Negative for cough, chest tightness, shortness of breath and wheezing  Cardiovascular: Negative for chest pain, palpitations and leg swelling  Gastrointestinal: Negative for abdominal pain, constipation, diarrhea, nausea, rectal pain and vomiting  Endocrine: Negative for cold intolerance, polydipsia and polyuria  Genitourinary: Negative for difficulty urinating, dysuria, flank pain, menstrual problem and pelvic pain  Musculoskeletal: Negative for arthralgias, joint swelling and myalgias  Skin: Negative for color change and rash  Allergic/Immunologic: Negative for environmental allergies and immunocompromised state  Neurological: Negative for dizziness, weakness, numbness and headaches  Hematological: Negative for adenopathy  Does not bruise/bleed easily  Psychiatric/Behavioral: Negative for decreased concentration, dysphoric mood, self-injury, sleep disturbance and suicidal ideas  The patient is not nervous/anxious  Current Outpatient Medications on File Prior to Visit   Medication Sig   • acetaminophen (TYLENOL) 325 mg tablet Take 325 mg by mouth daily as needed     • aspirin 81 mg chewable tablet Chew 81 mg daily    • Cholecalciferol (Vitamin D3) 50 MCG (2000 UT) capsule Take 1 capsule (2,000 Units total) by mouth daily   • DAILY MULTIPLE VITAMINS PO Take 1 tablet by mouth daily   • desonide (DESOWEN) 0 05 % cream Apply topically 2 (two) times a day   • nystatin (MYCOSTATIN) powder    • vitamin B-12 (VITAMIN B-12) 1,000 mcg tablet Take 2 tablets (2,000 mcg total) by mouth daily   • [DISCONTINUED] metFORMIN (GLUCOPHAGE-XR) 500 mg 24 hr tablet TAKE ONE TABLET EVERY DAY WITH DINNER   • [DISCONTINUED] spironolactone (ALDACTONE) 100 mg tablet TAKE ONE TABLET BY MOUTH IN THE MORNING   • Blood Glucose Monitoring Suppl (OneTouch Verio Reflect) w/Device KIT Check blood sugars once daily  Please substitute with appropriate alternative as covered by patient's insurance  Dx: E11 65   • Continuous Blood Gluc  (FreeStyle Saravia 2 [place]) LIZANDRO Use to check blood sugar   • Continuous Blood Gluc Sensor (FreeStyle Dana 2 Sensor) MISC Use to check blood sugar   • Diphenhydramine-APAP, sleep, (TYLENOL PM EXTRA STRENGTH)  MG/30ML LIQD Take by mouth as needed  (Patient not taking: Reported on 9/30/2021)   • glucose blood (OneTouch Verio) test strip Check blood sugars once daily  Please substitute with appropriate alternative as covered by patient's insurance  Dx: E11 65   • mupirocin (BACTROBAN) 2 % ointment Apply topically 3 (three) times a day (Patient not taking: Reported on 1/26/2023)   • OneTouch Delica Lancets 78A MISC Check blood sugars once daily   Please substitute with appropriate alternative as covered by patient's insurance  Dx: E11 65   • venlafaxine 225 MG TB24 Take 1 tablet by mouth daily  (Patient not taking: Reported on 3/30/2022 )       Objective     /80 (BP Location: Left arm, Patient Position: Sitting, Cuff Size: Large)   Pulse 72   Temp 98 6 °F (37 °C) (Temporal)   Resp 16   Ht 5' 2" (1 575 m)   Wt 124 kg (272 lb 12 8 oz)   SpO2 98%   BMI 49 90 kg/m²     Physical Exam  Vitals and nursing note reviewed  Constitutional:       Appearance: Normal appearance  She is well-developed  She is obese  HENT:      Head: Normocephalic and atraumatic  Right Ear: Tympanic membrane, ear canal and external ear normal       Left Ear: Tympanic membrane, ear canal and external ear normal       Nose: Nose normal       Mouth/Throat:      Mouth: Mucous membranes are moist       Pharynx: Oropharynx is clear  No oropharyngeal exudate  Eyes:      Extraocular Movements: Extraocular movements intact  Pupils: Pupils are equal, round, and reactive to light  Neck:      Thyroid: No thyromegaly  Cardiovascular:      Rate and Rhythm: Normal rate and regular rhythm  Pulses: no weak pulses          Dorsalis pedis pulses are 2+ on the right side and 2+ on the left side  Posterior tibial pulses are 1+ on the right side and 1+ on the left side  Heart sounds: Normal heart sounds  No murmur heard  Pulmonary:      Effort: Pulmonary effort is normal  No respiratory distress  Breath sounds: Normal breath sounds  No wheezing or rales  Abdominal:      General: Abdomen is flat  Bowel sounds are normal  There is no distension  Palpations: Abdomen is soft  Tenderness: There is no abdominal tenderness  There is no guarding  Genitourinary:     Vagina: Normal    Musculoskeletal:         General: No tenderness  Normal range of motion  Cervical back: Normal range of motion and neck supple     Feet:      Right foot:      Skin integrity: No ulcer, skin breakdown, erythema, warmth, callus or dry skin  Left foot:      Skin integrity: No ulcer, skin breakdown, erythema, warmth, callus or dry skin  Skin:     General: Skin is warm and dry  Capillary Refill: Capillary refill takes less than 2 seconds  Findings: No rash  Neurological:      General: No focal deficit present  Mental Status: She is alert and oriented to person, place, and time  Mental status is at baseline  Psychiatric:         Mood and Affect: Mood normal          Behavior: Behavior normal          Thought Content: Thought content normal          Judgment: Judgment normal      Patient's shoes and socks removed  Right Foot/Ankle   Right Foot Inspection  Skin Exam: skin normal and skin intact  No dry skin, no warmth, no callus, no erythema, no maceration, no abnormal color, no pre-ulcer, no ulcer and no callus  Toe Exam: ROM and strength within normal limits  Sensory   Vibration: intact  Proprioception: intact  Monofilament testing: intact    Vascular  Capillary refills: < 3 seconds  The right DP pulse is 2+  The right PT pulse is 1+  Right Toe  - Comprehensive Exam  Ecchymosis: none  Arch: normal  Hammertoes: absent  Claw Toes: absent  Swelling: none   Tenderness: none         Left Foot/Ankle  Left Foot Inspection  Skin Exam: skin normal and skin intact  No dry skin, no warmth, no erythema, no maceration, normal color, no pre-ulcer, no ulcer and no callus  Toe Exam: ROM and strength within normal limits  Sensory   Vibration: intact  Proprioception: intact  Monofilament testing: intact    Vascular  Capillary refills: < 3 seconds  The left DP pulse is 2+  The left PT pulse is 1+       Left Toe  - Comprehensive Exam  Ecchymosis: none  Arch: normal  Hammertoes: absent  Claw toes: absent  Swelling: none   Tenderness: none           Assign Risk Category  No deformity present  No loss of protective sensation  No weak pulses  Risk: 0    Yves Tompkins MD

## 2023-02-01 ENCOUNTER — TELEMEDICINE (OUTPATIENT)
Dept: DIABETES SERVICES | Facility: CLINIC | Age: 58
End: 2023-02-01

## 2023-02-01 DIAGNOSIS — E11.65 TYPE 2 DIABETES MELLITUS WITH HYPERGLYCEMIA, WITHOUT LONG-TERM CURRENT USE OF INSULIN (HCC): Primary | ICD-10-CM

## 2023-02-02 NOTE — PATIENT INSTRUCTIONS
Look for the [employer] link and supporting materials in your email, 1-2 days before your scheduled class  Your next class is Wednesday, Feb 8th from 6-8 pm  Log on to the meeting a few minutes before it starts to allow for any trouble with connection  Follow up with DSMT or MNT as needed
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
## 2023-02-08 ENCOUNTER — TELEMEDICINE (OUTPATIENT)
Dept: DIABETES SERVICES | Facility: CLINIC | Age: 58
End: 2023-02-08

## 2023-02-08 DIAGNOSIS — E11.65 TYPE 2 DIABETES MELLITUS WITH HYPERGLYCEMIA, WITHOUT LONG-TERM CURRENT USE OF INSULIN (HCC): Primary | ICD-10-CM
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
## 2023-02-09 NOTE — PATIENT INSTRUCTIONS
Look for the [employer] Teams link and supporting materials in your email, 1-2 days before your scheduled class  Your next class is Wed, Feb 15th from 6-8 pm  Log on to the meeting a few minutes before it starts to allow for any trouble with connection  Follow up with DSMT or MNT as needed

## 2023-02-15 ENCOUNTER — TELEMEDICINE (OUTPATIENT)
Dept: DIABETES SERVICES | Facility: CLINIC | Age: 58
End: 2023-02-15

## 2023-02-15 DIAGNOSIS — E11.65 TYPE 2 DIABETES MELLITUS WITH HYPERGLYCEMIA, WITHOUT LONG-TERM CURRENT USE OF INSULIN (HCC): Primary | ICD-10-CM
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
## 2023-02-16 NOTE — PATIENT INSTRUCTIONS
Look for the [employer] Teams link and supporting materials in your email, 1-2 days before your scheduled class  Your next class is on Wed, February 22nd from 6-8 pm  Log on to the meeting a few minutes before it starts to allow for any trouble with connection  Follow up with DSMT or MNT as needed

## 2023-02-22 ENCOUNTER — TELEMEDICINE (OUTPATIENT)
Dept: DIABETES SERVICES | Facility: CLINIC | Age: 58
End: 2023-02-22

## 2023-02-22 DIAGNOSIS — E11.65 TYPE 2 DIABETES MELLITUS WITH HYPERGLYCEMIA, WITHOUT LONG-TERM CURRENT USE OF INSULIN (HCC): Primary | ICD-10-CM
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
## 2023-04-25 LAB
LEFT EYE DIABETIC RETINOPATHY: NORMAL
RIGHT EYE DIABETIC RETINOPATHY: NORMAL

## 2023-05-30 ENCOUNTER — TELEPHONE (OUTPATIENT)
Dept: DIABETES SERVICES | Facility: CLINIC | Age: 58
End: 2023-05-30

## 2023-08-01 DIAGNOSIS — E11.9 TYPE 2 DIABETES MELLITUS WITHOUT COMPLICATION, WITHOUT LONG-TERM CURRENT USE OF INSULIN (HCC): ICD-10-CM

## 2023-08-01 RX ORDER — METFORMIN HYDROCHLORIDE 500 MG/1
500 TABLET, EXTENDED RELEASE ORAL
Qty: 90 TABLET | Refills: 0 | Status: SHIPPED | OUTPATIENT
Start: 2023-08-01 | End: 2023-08-14

## 2023-08-01 NOTE — TELEPHONE ENCOUNTER
Fax received from pharmacy requesting refill on patients metformin 500 mg TAB. Medication sent for refill at this time. Patient is scheduled to establish care with Dr. Dinora Whitman this month. 31.7

## 2023-08-14 ENCOUNTER — OFFICE VISIT (OUTPATIENT)
Dept: FAMILY MEDICINE CLINIC | Facility: CLINIC | Age: 58
End: 2023-08-14
Payer: MEDICARE

## 2023-08-14 VITALS
HEIGHT: 62 IN | BODY MASS INDEX: 50.79 KG/M2 | WEIGHT: 276 LBS | TEMPERATURE: 98 F | SYSTOLIC BLOOD PRESSURE: 134 MMHG | OXYGEN SATURATION: 97 % | HEART RATE: 86 BPM | DIASTOLIC BLOOD PRESSURE: 88 MMHG

## 2023-08-14 DIAGNOSIS — E11.65 TYPE 2 DIABETES MELLITUS WITH HYPERGLYCEMIA, WITHOUT LONG-TERM CURRENT USE OF INSULIN (HCC): Primary | ICD-10-CM

## 2023-08-14 DIAGNOSIS — M35.00 SICCA, UNSPECIFIED TYPE (HCC): ICD-10-CM

## 2023-08-14 DIAGNOSIS — Z12.4 SCREENING FOR CERVICAL CANCER: ICD-10-CM

## 2023-08-14 DIAGNOSIS — E11.9 TYPE 2 DIABETES MELLITUS WITHOUT COMPLICATION, WITHOUT LONG-TERM CURRENT USE OF INSULIN (HCC): ICD-10-CM

## 2023-08-14 DIAGNOSIS — Z11.59 NEED FOR HEPATITIS C SCREENING TEST: ICD-10-CM

## 2023-08-14 DIAGNOSIS — L57.0 KERATOSIS: ICD-10-CM

## 2023-08-14 DIAGNOSIS — E55.9 VITAMIN D DEFICIENCY: ICD-10-CM

## 2023-08-14 DIAGNOSIS — E55.9 VITAMIN D INSUFFICIENCY: ICD-10-CM

## 2023-08-14 DIAGNOSIS — I10 ESSENTIAL HYPERTENSION: ICD-10-CM

## 2023-08-14 DIAGNOSIS — E78.2 MIXED HYPERLIPIDEMIA: ICD-10-CM

## 2023-08-14 DIAGNOSIS — R53.83 FATIGUE, UNSPECIFIED TYPE: ICD-10-CM

## 2023-08-14 LAB — SL AMB POCT HEMOGLOBIN AIC: 7.1 (ref ?–6.5)

## 2023-08-14 PROCEDURE — 83036 HEMOGLOBIN GLYCOSYLATED A1C: CPT | Performed by: FAMILY MEDICINE

## 2023-08-14 PROCEDURE — 99215 OFFICE O/P EST HI 40 MIN: CPT | Performed by: FAMILY MEDICINE

## 2023-08-14 RX ORDER — ATORVASTATIN CALCIUM 10 MG/1
10 TABLET, FILM COATED ORAL DAILY
Qty: 90 TABLET | Refills: 3 | Status: SHIPPED | OUTPATIENT
Start: 2023-08-14

## 2023-08-14 RX ORDER — METFORMIN HYDROCHLORIDE 500 MG/1
1000 TABLET, EXTENDED RELEASE ORAL
Qty: 180 TABLET | Refills: 3 | Status: SHIPPED | OUTPATIENT
Start: 2023-08-14 | End: 2023-08-14 | Stop reason: SDUPTHER

## 2023-08-14 RX ORDER — ACETAMINOPHEN 160 MG
2000 TABLET,DISINTEGRATING ORAL DAILY
Qty: 90 CAPSULE | Refills: 1 | Status: SHIPPED | OUTPATIENT
Start: 2023-08-14

## 2023-08-14 RX ORDER — METFORMIN HYDROCHLORIDE 500 MG/1
1000 TABLET, EXTENDED RELEASE ORAL
Qty: 180 TABLET | Refills: 3 | Status: SHIPPED | OUTPATIENT
Start: 2023-08-14

## 2023-08-14 NOTE — PROGRESS NOTES
Name: Ezio Valdovinos      : 1965      MRN: [id number]  Encounter Provider: Marcin Cho DO  Encounter Date: 2023   Encounter department: [address] [place] [address].     1. Type 2 diabetes mellitus with hyperglycemia, without long-term current use of insulin (HCC)  -     IRIS Diabetic eye exam  -     Hepatitis C Antibody; Future  -     Cholecalciferol (Vitamin D3) 50 MCG (2000 UT) capsule; Take 1 capsule (2,000 Units total) by mouth daily  -     POCT hemoglobin A1c  -     Ambulatory referral to Dermatology; Future; Expected date: 2023  -     atorvastatin (LIPITOR) 10 mg tablet; Take 1 tablet (10 mg total) by mouth daily  -     CBC; Future  -     UA w Reflex to Microscopic w Reflex to Culture  -     Lipid panel; Future  -     TSH, 3rd generation; Future  -     Vitamin D 25 hydroxy; Future  -     Albumin / creatinine urine ratio; Future; Expected date: 2023  -     Comprehensive metabolic panel; Future; Expected date: 2023  -     Hemoglobin A1C; Future; Expected date: 2023    2. Need for hepatitis C screening test  -     Hepatitis C Antibody; Future    3. Screening for cervical cancer    4. Vitamin D deficiency  -     Cholecalciferol (Vitamin D3) 50 MCG (2000 UT) capsule; Take 1 capsule (2,000 Units total) by mouth daily    5. Keratosis  -     Ambulatory referral to Dermatology; Future; Expected date: 2023    6. Essential hypertension  -     IRIS Diabetic eye exam  -     Hepatitis C Antibody; Future  -     Cholecalciferol (Vitamin D3) 50 MCG (2000 UT) capsule; Take 1 capsule (2,000 Units total) by mouth daily  -     POCT hemoglobin A1c  -     Ambulatory referral to Dermatology; Future; Expected date: 2023  -     atorvastatin (LIPITOR) 10 mg tablet; Take 1 tablet (10 mg total) by mouth daily  -     CBC; Future  -     UA w Reflex to Microscopic w Reflex to Culture  -     Lipid panel; Future  -     TSH, 3rd generation;  Future  -     Vitamin D 25 hydroxy; Future  -     Albumin / creatinine urine ratio; Future; Expected date: 11/14/2023  -     Comprehensive metabolic panel; Future; Expected date: 11/14/2023  -     Hemoglobin A1C; Future; Expected date: 11/14/2023    7. Mixed hyperlipidemia  -     IRIS Diabetic eye exam  -     Hepatitis C Antibody; Future  -     Cholecalciferol (Vitamin D3) 50 MCG (2000 UT) capsule; Take 1 capsule (2,000 Units total) by mouth daily  -     POCT hemoglobin A1c  -     Ambulatory referral to Dermatology; Future; Expected date: 09/14/2023  -     atorvastatin (LIPITOR) 10 mg tablet; Take 1 tablet (10 mg total) by mouth daily  -     CBC; Future  -     UA w Reflex to Microscopic w Reflex to Culture  -     Lipid panel; Future  -     TSH, 3rd generation; Future  -     Vitamin D 25 hydroxy; Future  -     Albumin / creatinine urine ratio; Future; Expected date: 11/14/2023  -     Comprehensive metabolic panel; Future; Expected date: 11/14/2023  -     Hemoglobin A1C; Future; Expected date: 11/14/2023    8. Fatigue, unspecified type  -     TSH, 3rd generation; Future    9. Vitamin D insufficiency  -     Vitamin D 25 hydroxy; Future    10. [place], unspecified type ([address])    11. Type 2 diabetes mellitus without complication, without long-term current use of insulin (HCC)  -     metFORMIN (GLUCOPHAGE-XR) 500 mg 24 hr tablet; Take 2 tablets (1,000 mg total) by mouth daily with dinner        Subjective      HPI - 63 yo female, retired  due to lymphedema from ovarian  Cancer. 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 And something happened with anesthesia re memory loss and retention. 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 This is the first OV I have had with this very nice lady. Did follow with Dr. Nora Mcclendon    Very long discussion re DM and management of meds, etc.  [facility] may be a great product for her. I asked her to research that, and we talk more later. At this point:  Double dose of Metformin 500 mg to 1 g a day    Also--discussed the value of an ACE inhibitor and a statin to her medical regime. At this time, will add a statin Lipitor and at next visit hopefully begin the ACE inhibitor. I did not want to add to new medications upon first meeting her. I will see her again in 3 months sooner if necessary      Review of Systems   Constitutional: Negative for activity change, appetite change, fatigue and unexpected weight change. HENT: Negative for ear pain, sore throat, trouble swallowing and voice change. Eyes: Negative for photophobia and visual disturbance. Respiratory: Negative for cough, chest tightness, shortness of breath and wheezing. Cardiovascular: Negative for chest pain, palpitations and leg swelling. Gastrointestinal: Negative for abdominal pain, constipation, diarrhea, nausea, rectal pain and vomiting. Endocrine: Negative for cold intolerance, polydipsia and polyuria. Genitourinary: Negative for difficulty urinating, dysuria, flank pain, menstrual problem and pelvic pain. Musculoskeletal: Negative for arthralgias, joint swelling and myalgias. Skin: Negative for color change and rash. Allergic/Immunologic: Negative for environmental allergies and immunocompromised state. Neurological: Negative for dizziness, weakness, numbness and headaches. Hematological: Negative for adenopathy. Does not bruise/bleed easily. Psychiatric/Behavioral: Negative for decreased concentration, dysphoric mood, self-injury, sleep disturbance and suicidal ideas. The patient is not nervous/anxious. Current Outpatient Medications on File Prior to Visit   Medication Sig   • acetaminophen (TYLENOL) 325 mg tablet Take 325 mg by mouth daily as needed     • aspirin 81 mg chewable tablet Chew 81 mg daily    • Blood Glucose Monitoring Suppl (OneTouch Verio Reflect) w/Device KIT Check blood sugars once daily.  Please substitute with appropriate alternative as covered by patient's insurance. Dx: E11.65   • Continuous Blood Gluc  (FreeStyle [place] 2 [place]) LIZANDRO Use to check blood sugar   • Continuous Blood Gluc Sensor (FreeStyle Dana 2 Sensor) MISC Use to check blood sugar   • DAILY MULTIPLE VITAMINS PO Take 1 tablet by mouth daily   • desonide (DESOWEN) 0.05 % cream Apply topically 2 (two) times a day   • glucose blood (OneTouch Verio) test strip Check blood sugars once daily. Please substitute with appropriate alternative as covered by patient's insurance. Dx: E11.65   • mupirocin (BACTROBAN) 2 % ointment Apply topically 3 (three) times a day   • nystatin (MYCOSTATIN) powder    • OneTouch Delica Lancets 72D MISC Check blood sugars once daily. Please substitute with appropriate alternative as covered by patient's insurance. Dx: E11.65   • spironolactone (ALDACTONE) 100 mg tablet Take 1 tablet (100 mg total) by mouth every morning   • vitamin B-12 (VITAMIN B-12) 1,000 mcg tablet Take 2 tablets (2,000 mcg total) by mouth daily   • [DISCONTINUED] Cholecalciferol (Vitamin D3) 50 MCG (2000 UT) capsule Take 1 capsule (2,000 Units total) by mouth daily   • [DISCONTINUED] metFORMIN (GLUCOPHAGE-XR) 500 mg 24 hr tablet Take 1 tablet (500 mg total) by mouth daily with dinner   • [DISCONTINUED] Diphenhydramine-APAP, sleep, (TYLENOL PM EXTRA STRENGTH)  MG/30ML LIQD Take by mouth as needed  (Patient not taking: Reported on 9/30/2021)   • [DISCONTINUED] venlafaxine 225 MG TB24 Take 1 tablet by mouth daily  (Patient not taking: Reported on 3/30/2022)       Objective     /88 (BP Location: Left arm, Patient Position: Sitting, Cuff Size: Standard)   Pulse 86   Temp 98 °F (36.7 °C) (Temporal)   Ht 5' 2" (1.575 m)   Wt 125 kg (276 lb)   SpO2 97%   BMI 50.48 kg/m²     Physical Exam  Vitals and nursing note reviewed. Constitutional:       Appearance: Normal appearance. She is well-developed. She is obese.    HENT:      Head: Normocephalic and atraumatic. Right Ear: Tympanic membrane, ear canal and external ear normal.      Left Ear: Tympanic membrane, ear canal and external ear normal.      Nose: Nose normal.      Mouth/Throat:      Mouth: Mucous membranes are moist.      Pharynx: Oropharynx is clear. No oropharyngeal exudate. Eyes:      Extraocular Movements: Extraocular movements intact. Pupils: Pupils are equal, round, and reactive to light. Neck:      Thyroid: No thyromegaly. Cardiovascular:      Rate and Rhythm: Normal rate and regular rhythm. Pulses:           Dorsalis pedis pulses are 2+ on the right side and 2+ on the left side. Posterior tibial pulses are 1+ on the right side and 1+ on the left side. Heart sounds: Normal heart sounds. No murmur heard. Pulmonary:      Effort: Pulmonary effort is normal. No respiratory distress. Breath sounds: Normal breath sounds. No wheezing or rales. Abdominal:      General: Abdomen is flat. Bowel sounds are normal. There is no distension. Palpations: Abdomen is soft. Tenderness: There is no abdominal tenderness. There is no guarding. Genitourinary:     Vagina: Normal.   Musculoskeletal:         General: No tenderness. Normal range of motion. Cervical back: Normal range of motion and neck supple. Feet:      Right foot:      Skin integrity: No ulcer, skin breakdown, erythema, warmth, callus or dry skin. Left foot:      Skin integrity: No ulcer, skin breakdown, erythema, warmth, callus or dry skin. Skin:     General: Skin is warm and dry. Capillary Refill: Capillary refill takes less than 2 seconds. Findings: No rash. Neurological:      General: No focal deficit present. Mental Status: She is alert and oriented to person, place, and time. Mental status is at baseline. Psychiatric:         Mood and Affect: Mood normal.         Behavior: Behavior normal.         Thought Content:  Thought content normal.         Judgment: Judgment normal.         BMI Counseling: Body mass index is 50.48 kg/m². The BMI is above normal. Nutrition recommendations include decreasing portion sizes, encouraging healthy choices of fruits and vegetables, decreasing fast food intake, consuming healthier snacks, limiting drinks that contain sugar, moderation in carbohydrate intake, increasing intake of lean protein and reducing intake of saturated and trans fat. Exercise recommendations include moderate physical activity 150 minutes/week and exercising 3-5 times per week. No pharmacotherapy was ordered. Rationale for BMI follow-up plan is due to patient being overweight or obese. I personally reviewed the recent (and prior)  lab results, the image studies, pathology, other specialty/physicians consult notes and recommendations, and outside medical records from other institutions, as appropriate. I had a lengthy discussion with the patient and shared the work-up findings. We discussed the diagnosis and management plan. I spent  45  minutes reviewing the records (labs, clinician notes, outside records, medical history, ordering medicine/tests/procedures, interpreting the imaging/labs previously done) and coordination of care as well as direct time with the patient today, of which greater than 50% of the time was spent in counseling and coordination of care with the patient/family.       Aneesh Luke DO

## 2023-08-15 ENCOUNTER — TELEPHONE (OUTPATIENT)
Dept: ADMINISTRATIVE | Facility: OTHER | Age: 58
End: 2023-08-15

## 2023-08-15 NOTE — LETTER
Diabetic Eye Exam Form    Date Requested: 08/15/23  Patient: Alan Douglass  Patient : 1965   Referring Provider: Jessica Rizo DO      DIABETIC Eye Exam Date _______________________________      Type of Exam MUST be documented for Diabetic Eye Exams. Please CHECK ONE. Retinal Exam       Dilated Retinal Exam       OCT       Optomap-Iris Exam      Fundus Photography       Left Eye - Please check Retinopathy or No Retinopathy        Exam did show retinopathy    Exam did not show retinopathy       Right Eye - Please check Retinopathy or No Retinopathy       Exam did show retinopathy    Exam did not show retinopathy       Comments __________________________________________________________    Practice Providing Exam ______________________________________________    Exam Performed By (print name) _______________________________________      Provider Signature ___________________________________________________      These reports are needed for  compliance. Please fax this completed form and a copy of the Diabetic Eye Exam report to our office located at [address] [place] [address] as soon as possible via Fax [phone] attention Warren Melgar: Phone [phone]  We thank you for your assistance in treating our mutual patient.

## 2023-08-15 NOTE — TELEPHONE ENCOUNTER
Upon review of the In Basket request and the patient's chart, initial outreach has been made via fax to facility. Please see Contacts section for details.       Dr. Ubaldo Coronado - faxed 8-15    Thank you  Ilir Hui MA

## 2023-08-15 NOTE — TELEPHONE ENCOUNTER
----- Message from Jonelle Severs sent at 8/14/2023  3:00 PM EDT -----  Regarding: care gap request  08/14/23 3:00 PM    Hello, our patient attached above has had Pap Smear (HPV) aka Cervical Cancer Screening completed/performed. Please assist in updating the patient chart by making an External outreach to [id number] [place] [address] located in [facility] in [facility]. The date of service is 11/22.     Thank you,  Jonelle Severs   PRIMARY [facility] [place]

## 2023-08-15 NOTE — TELEPHONE ENCOUNTER
----- Message from Aristeo Muller sent at 8/14/2023  2:59 PM EDT -----  Regarding: care gap request  08/14/23 2:59 PM    Hello, our patient attached above has had Diabetic Eye Exam completed/performed. Please assist in updating the patient chart by making an External outreach to Dr. Bonnie Pat facility located on [address] [place] [address]. The date of service is June 2023.     Thank you,  Aristeo FAJARDO [facility]       Faxed 08/15/2023

## 2023-08-24 NOTE — TELEPHONE ENCOUNTER
Upon review of the In Basket request we were able to locate, review, and update the patient chart as requested for Diabetic Eye Exam.    Any additional questions or concerns should be emailed to the Practice Liaisons via the appropriate education email address, please do not reply via In Basket.     Thank you  Anna Weldon MA

## 2023-11-03 LAB — HBA1C MFR BLD HPLC: 7.4 %

## 2023-11-08 ENCOUNTER — RA CDI HCC (OUTPATIENT)
Dept: OTHER | Facility: HOSPITAL | Age: 58
End: 2023-11-08

## 2023-11-15 ENCOUNTER — OFFICE VISIT (OUTPATIENT)
Dept: FAMILY MEDICINE CLINIC | Facility: CLINIC | Age: 58
End: 2023-11-15
Payer: MEDICARE

## 2023-11-15 VITALS
HEART RATE: 75 BPM | OXYGEN SATURATION: 96 % | BODY MASS INDEX: 49.98 KG/M2 | TEMPERATURE: 98.1 F | DIASTOLIC BLOOD PRESSURE: 90 MMHG | SYSTOLIC BLOOD PRESSURE: 132 MMHG | HEIGHT: 62 IN | WEIGHT: 271.6 LBS

## 2023-11-15 DIAGNOSIS — Z79.899 MEDICATION MANAGEMENT: ICD-10-CM

## 2023-11-15 DIAGNOSIS — Z79.899 ENCOUNTER FOR LONG-TERM (CURRENT) USE OF MEDICATIONS: ICD-10-CM

## 2023-11-15 DIAGNOSIS — E11.65 TYPE 2 DIABETES MELLITUS WITH HYPERGLYCEMIA, WITHOUT LONG-TERM CURRENT USE OF INSULIN (HCC): ICD-10-CM

## 2023-11-15 DIAGNOSIS — Z00.00 MEDICARE ANNUAL WELLNESS VISIT, SUBSEQUENT: Primary | ICD-10-CM

## 2023-11-15 DIAGNOSIS — L81.9 CHANGE IN MULTIPLE PIGMENTED SKIN LESIONS: ICD-10-CM

## 2023-11-15 DIAGNOSIS — Z23 ENCOUNTER FOR IMMUNIZATION: ICD-10-CM

## 2023-11-15 DIAGNOSIS — Z71.2 ENCOUNTER TO DISCUSS TEST RESULTS: ICD-10-CM

## 2023-11-15 DIAGNOSIS — Z97.8 USES SELF-APPLIED CONTINUOUS GLUCOSE MONITORING DEVICE: ICD-10-CM

## 2023-11-15 PROCEDURE — G0439 PPPS, SUBSEQ VISIT: HCPCS | Performed by: FAMILY MEDICINE

## 2023-11-15 PROCEDURE — 90677 PCV20 VACCINE IM: CPT | Performed by: FAMILY MEDICINE

## 2023-11-15 PROCEDURE — 99214 OFFICE O/P EST MOD 30 MIN: CPT | Performed by: FAMILY MEDICINE

## 2023-11-15 PROCEDURE — G0009 ADMIN PNEUMOCOCCAL VACCINE: HCPCS | Performed by: FAMILY MEDICINE
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
## 2023-11-15 NOTE — PATIENT INSTRUCTIONS
Medicare Preventive Visit Patient Instructions  Thank you for completing your Welcome to Medicare Visit or Medicare Annual Wellness Visit today. Your next wellness visit will be due in one year (11/15/2024). The screening/preventive services that you may require over the next 5-10 years are detailed below. Some tests may not apply to you based off risk factors and/or age. Screening tests ordered at today's visit but not completed yet may show as past due. Also, please note that scanned in results may not display below. Preventive Screenings:  Service Recommendations Previous Testing/Comments   Colorectal Cancer Screening  * Colonoscopy    * Fecal Occult Blood Test (FOBT)/Fecal Immunochemical Test (FIT)  * Fecal DNA/Cologuard Test  * Flexible Sigmoidoscopy Age: 43-73 years old   Colonoscopy: every 10 years (may be performed more frequently if at higher risk)  OR  FOBT/FIT: every 1 year  OR  Cologuard: every 3 years  OR  Sigmoidoscopy: every 5 years  Screening may be recommended earlier than age 39 if at higher risk for colorectal cancer. Also, an individualized decision between you and your healthcare provider will decide whether screening between the ages of 77-80 would be appropriate. Colonoscopy: 03/26/2015  FOBT/FIT: Not on file  Cologuard: Not on file  Sigmoidoscopy: Not on file    Screening Current     Breast Cancer Screening Age: 36 years old  Frequency: every 1-2 years  Not required if history of left and right mastectomy Mammogram: 06/08/2023    Screening Current   Cervical Cancer Screening Between the ages of 21-29, pap smear recommended once every 3 years. Between the ages of 32-69, can perform pap smear with HPV co-testing every 5 years.    Recommendations may differ for women with a history of total hysterectomy, cervical cancer, or abnormal pap smears in past. Pap Smear: Not on file        Hepatitis C Screening Once for adults born between 1945 and 1965  More frequently in patients at high risk for Hepatitis C Hep C Antibody: Not on file        Diabetes Screening 1-2 times per year if you're at risk for diabetes or have pre-diabetes Fasting glucose: No results in last 5 years (No results in last 5 years)  A1C: 7.4 % (11/3/2023)  Screening Not Indicated  History Diabetes   Cholesterol Screening Once every 5 years if you don't have a lipid disorder. May order more often based on risk factors. Lipid panel: 11/03/2023    Screening Not Indicated  History Lipid Disorder     Other Preventive Screenings Covered by Medicare:  Abdominal Aortic Aneurysm (AAA) Screening: covered once if your at risk. You're considered to be at risk if you have a family history of AAA. Lung Cancer Screening: covers low dose CT scan once per year if you meet all of the following conditions: (1) Age 48-67; (2) No signs or symptoms of lung cancer; (3) Current smoker or have quit smoking within the last 15 years; (4) You have a tobacco smoking history of at least 20 pack years (packs per day multiplied by number of years you smoked); (5) You get a written order from a healthcare provider. Glaucoma Screening: covered annually if you're considered high risk: (1) You have diabetes OR (2) Family history of glaucoma OR (3)  aged 48 and older OR (3)  American aged 72 and older  Osteoporosis Screening: covered every 2 years if you meet one of the following conditions: (1) You're estrogen deficient and at risk for osteoporosis based off medical history and other findings; (2) Have a vertebral abnormality; (3) On glucocorticoid therapy for more than 3 months; (4) Have primary hyperparathyroidism; (5) On osteoporosis medications and need to assess response to drug therapy. Last bone density test (DXA Scan): Not on file. HIV Screening: covered annually if you're between the age of 14-79. Also covered annually if you are younger than 13 and older than 72 with risk factors for HIV infection.  For pregnant patients, it is covered up to 3 times per pregnancy. Immunizations:  Immunization Recommendations   Influenza Vaccine Annual influenza vaccination during flu season is recommended for all persons aged >= 6 months who do not have contraindications   Pneumococcal Vaccine   * Pneumococcal conjugate vaccine = PCV13 (Prevnar 13), PCV15 (Vaxneuvance), PCV20 (Prevnar 20)  * Pneumococcal polysaccharide vaccine = PPSV23 (Pneumovax) Adults 94-91 yo with certain risk factors or if 69+ yo  If never received any pneumonia vaccine: recommend Prevnar 20 (PCV20)  Give PCV20 if previously received 1 dose of PCV13 or PPSV23   Hepatitis B Vaccine 3 dose series if at intermediate or high risk (ex: diabetes, end stage renal disease, liver disease)   Respiratory syncytial virus (RSV) Vaccine - COVERED BY MEDICARE PART D  * RSVPreF3 (Arexvy) CDC recommends that adults 61years of age and older may receive a single dose of RSV vaccine using shared clinical decision-making (SCDM)   Tetanus (Td) Vaccine - COST NOT COVERED BY MEDICARE PART B Following completion of primary series, a booster dose should be given every 10 years to maintain immunity against tetanus. Td may also be given as tetanus wound prophylaxis. Tdap Vaccine - COST NOT COVERED BY MEDICARE PART B Recommended at least once for all adults. For pregnant patients, recommended with each pregnancy.    Shingles Vaccine (Shingrix) - COST NOT COVERED BY MEDICARE PART B  2 shot series recommended in those 19 years and older who have or will have weakened immune systems or those 50 years and older     Health Maintenance Due:      Topic Date Due   • Hepatitis C Screening  Never done   • HIV Screening  Never done   • Cervical Cancer Screening  Never done   • Colorectal Cancer Screening  Never done   • Breast Cancer Screening: Mammogram  06/08/2024     Immunizations Due:      Topic Date Due   • Pneumococcal Vaccine: Pediatrics (0 to 5 Years) and At-Risk Patients (6 to 59 Years) (2 - PPSV23 if available, else PCV20) 12/18/2020   • COVID-19 Vaccine (3 - Moderna series) 05/13/2021   • Influenza Vaccine (1) 09/01/2023     Advance Directives   What are advance directives? Advance directives are legal documents that state your wishes and plans for medical care. These plans are made ahead of time in case you lose your ability to make decisions for yourself. Advance directives can apply to any medical decision, such as the treatments you want, and if you want to donate organs. What are the types of advance directives? There are many types of advance directives, and each state has rules about how to use them. You may choose a combination of any of the following:  Living will: This is a written record of the treatment you want. You can also choose which treatments you do not want, which to limit, and which to stop at a certain time. This includes surgery, medicine, IV fluid, and tube feedings. Durable power of  for healthcare [facility] [place]): This is a written record that states who you want to make healthcare choices for you when you are unable to make them for yourself. This person, called a proxy, is usually a family member or a friend. You may choose more than 1 proxy. Do not resuscitate (DNR) order:  A DNR order is used in case your heart stops beating or you stop breathing. It is a request not to have certain forms of treatment, such as CPR. A DNR order may be included in other types of advance directives. Medical directive: This covers the care that you want if you are in a coma, near death, or unable to make decisions for yourself. You can list the treatments you want for each condition. Treatment may include pain medicine, surgery, blood transfusions, dialysis, IV or tube feedings, and a ventilator (breathing machine). Values history: This document has questions about your views, beliefs, and how you feel and think about life. This information can help others choose the care that you would choose.   Why are advance directives important? An advance directive helps you control your care. Although spoken wishes may be used, it is better to have your wishes written down. Spoken wishes can be misunderstood, or not followed. Treatments may be given even if you do not want them. An advance directive may make it easier for your family to make difficult choices about your care. Weight Management   Why it is important to manage your weight:  Being overweight increases your risk of health conditions such as heart disease, high blood pressure, type 2 diabetes, and certain types of cancer. It can also increase your risk for osteoarthritis, sleep apnea, and other respiratory problems. Aim for a slow, steady weight loss. Even a small amount of weight loss can lower your risk of health problems. How to lose weight safely:  A safe and healthy way to lose weight is to eat fewer calories and get regular exercise. You can lose up about 1 pound a week by decreasing the number of calories you eat by 500 calories each day. Healthy meal plan for weight management:  A healthy meal plan includes a variety of foods, contains fewer calories, and helps you stay healthy. A healthy meal plan includes the following:  Eat whole-grain foods more often. A healthy meal plan should contain fiber. Fiber is the part of grains, fruits, and vegetables that is not broken down by your body. Whole-grain foods are healthy and provide extra fiber in your diet. Some examples of whole-grain foods are whole-wheat breads and pastas, oatmeal, brown rice, and bulgur. Eat a variety of vegetables every day. Include dark, leafy greens such as spinach, kale, mel greens, and mustard greens. Eat yellow and orange vegetables such as carrots, sweet potatoes, and winter squash. Eat a variety of fruits every day. Choose fresh or canned fruit (canned in its own juice or light syrup) instead of juice. Fruit juice has very little or no fiber.   Eat low-fat dairy foods.  Drink fat-free (skim) milk or 1% milk. Eat fat-free yogurt and low-fat cottage cheese. Try low-fat cheeses such as mozzarella and other reduced-fat cheeses. Choose meat and other protein foods that are low in fat. Choose beans or other legumes such as split peas or lentils. Choose fish, skinless poultry (chicken or turkey), or lean cuts of red meat (beef or pork). Before you cook meat or poultry, cut off any visible fat. Use less fat and oil. Try baking foods instead of frying them. Add less fat, such as margarine, sour cream, regular salad dressing and mayonnaise to foods. Eat fewer high-fat foods. Some examples of high-fat foods include french fries, doughnuts, ice cream, and cakes. Eat fewer sweets. Limit foods and drinks that are high in sugar. This includes candy, cookies, regular soda, and sweetened drinks. Exercise:  Exercise at least 30 minutes per day on most days of the week. Some examples of exercise include walking, biking, dancing, and swimming. You can also fit in more physical activity by taking the stairs instead of the elevator or parking farther away from stores. Ask your healthcare provider about the best exercise plan for you. © Copyright [employer] 2018 Information is for End User's use only and may not be sold, redistributed or otherwise used for commercial purposes.  All illustrations and images included in CareNotes® are the copyrighted property of A.D.A.M., Inc. or  [employer]

## 2023-11-15 NOTE — PROGRESS NOTES
Assessment/Plan:          1. Medicare annual wellness visit, subsequent    2. Type 2 diabetes mellitus with hyperglycemia, without long-term current use of insulin ([address])  Assessment & Plan:  Assured the patient that she is medically able to take [facility]. She will administer the medication once a week. Provided additional instructions on its usage. She will continue to monitor if she tolerates the dosage well, and make further adjustments when necessary. Risk and benefits were discussed with the patient in detail. Orders:  -     tirzepatide 5 MG/0.5ML; Inject 0.5 mL (5 mg total) under the skin every 7 days    3. Encounter to discuss test results    4. Medication management  Assessment & Plan:  She will start taking Lipitor and discussed that she may take it anytime. She will take 2 metformin daily at dinner time. 5. Encounter for long-term (current) use of medications    6. Uses self-applied continuous glucose monitoring device  Assessment & Plan:  She has the [employer] 2 sensor and reader, and she checks her glucose levels periodically throughout the day. It is very helpful in preventing hypoglycemia and following hyperglycemia. Average fasting is 128 mg/dL. Discussed that her levels should not maintain in the range of 200 mg/dL. She has an erratic work schedule, has unconventional mealtimes and unconventional activity levels. She is recovering from ovarian cancer and multiple surgeries -- remotely a few years ago. She is concerned about nocturnal hypoglycemia and is highly motivated to achieve and maintain ideal glycemic control. She is going on Mounjaro to monitor even tighter blood sugar control therefore; I am recommending the continuous glucose monitor in the style of [employer] [place] 2 which she has been using on her own very consistently. 7. Change in multiple pigmented skin lesions  -     Ambulatory referral to Dermatology; Future; Expected date: 12/15/2023    8.  Encounter for immunization  -     Pneumococcal Conjugate Vaccine 20-valent (Pcv20)        Immunization  She prescription for Tdap vaccine is given which she may acquire at a pharmacy. Wellness  Discussed that Mounjaro weight loss goes well with engaging physical exercises. Assured that the lesion in her upper back is of no significant concern. Additional health information and management were discussed with the patient in detail. She will follow up in 2 months. Depression Screening and Follow-up Plan: Patient was screened for depression during today's encounter. They screened negative with a PHQ-2 score of 0. Subjective:       Patient ID: Anjana Chino is a 62 y.o. female who presents for a 3-month follow-up. The patient has a history of ovarian cancer and lymphedema. She reports having a history of shingles approximately in 2014, prior to her ovarian cancer. She has not started taking metformin twice daily. She only had took it once as she states she "do not need any more variables right now". She worries about how it will affect her levels, hence she will adhere to the current plan. She inquires about taking Mounjaro with her history of lymphedema. She inquires if she is able to acquire the medication with her current insurance as well. She checks her glucose levels periodically with her [employer] [place] 2 monitoring device. She reports her A1c was slightly elevated since she was not taking metformin. Her monitor device reads her average glucose levels 128 mg/dL for 7 days. Her levels in the mornings read up to 126 mg/dL when she eats. She reports that her levels would occasionally reach up to 200 mg/dL. She believes her recent A1c was 7.4% from 7.1%. Her lymphedema is located in her legs, due to the surgery that addressed her ovarian cancer. She had not undergone chemotherapy and radiation, as she states her cancer was encapsulated. She establishes care with Dr. Daryl De La Rosa.     She expresses her desire to lose 20 pounds. She has not started taking Lipitor and inquires what time of day she will take it. She takes all her medications at night. She has received her COVID-19 vaccination and influenza vaccine in 10/2023. She inquires about receiving her pneumonia vaccine. She has received her first and second dose of shingles vaccine in 2023, however, she is unsure of the specific month. She is not bothered with receiving her RSV vaccine. Her last colonoscopy was in Spring of 2015. Her last mammogram was in 01/2023. She reports her CA 19-9 results were elevated when she was experiencing significant issues. She has albinism. She reports that her dermatologist has never scheduled her for a follow up. She has a lesion in her neck. She inquires about her follow up with dermatologist.    She has stopped teaching. Her alcohol consumption is very minimal. She always wear her seatbelt when she drives. She inquires about a lesion of her upper back. Results from 11/03/2023 were reviewed. A1c is 7.4%. CA 19-9 and CEA results are normal. CEA-125 is 8 units. Cholesterol level is slightly elevated at 208 mg/dL. Triglyceride is 149 mg/dL. HDL is 44 mg/dL and LDL is 134 mg/dL. The following portions of the patient's history were reviewed and updated as appropriate: allergies, current medications, past family history, past medical history, past social history, past surgical history, and problem list.          Objective:       /90 (BP Location: Left arm, Patient Position: Sitting, Cuff Size: Standard)   Pulse 75   Temp 98.1 °F (36.7 °C) (Temporal)   Ht 5' 2" (1.575 m)   Wt 123 kg (271 lb 9.6 oz)   SpO2 96%   BMI 49.68 kg/m²          Physical Exam  Vitals and nursing note reviewed. Constitutional:       General: He is not in acute distress. Appearance: Normal appearance. He is well-developed. HENT:      Head: Normocephalic and atraumatic.    Eyes:      General:         Right eye: No discharge. Left eye: No discharge. Neck:      Thyroid: No thyromegaly. Cardiovascular:      Rate and Rhythm: Normal rate and regular rhythm. Pulses: Normal pulses. Heart sounds: Normal heart sounds. No murmur heard. Pulmonary:      Effort: Pulmonary effort is normal.      Breath sounds: Normal breath sounds. No wheezing or rhonchi. Musculoskeletal:      Cervical back: Neck supple. Right lower leg: No edema. Left lower leg: No edema. Lymphadenopathy:      Cervical: No cervical adenopathy. Skin:     General: Skin is warm. Capillary Refill: Capillary refill takes less than 2 seconds. Neurological:      General: No focal deficit present. Mental Status: He is alert and oriented to person, place, and time. Psychiatric:         Mood and Affect: Mood normal.         Behavior: Behavior normal.         Thought Content: Thought content normal.        I personally reviewed the recent (and prior)  lab results, the image studies, pathology, other specialty/physicians consult notes and recommendations, and outside medical records from other institutions, as appropriate. I had a lengthy discussion with the patient and shared the work-up findings. We discussed the diagnosis and management plan. I spent  30  minutes reviewing the records (labs, clinician notes, outside records, medical history, ordering medicine/tests/procedures, interpreting the imaging/labs previously done) and coordination of care as well as direct time with the patient today, of which greater than 50% of the time was spent in counseling and coordination of care with the patient/family. Transcribed for Roger White DO, by Nuria Kam on 11/16/23 at 11:17 PM. Powered by [employer].

## 2023-11-16 NOTE — ASSESSMENT & PLAN NOTE
Assured the patient that she is medically able to take [facility]. She will administer the medication once a week. Provided additional instructions on its usage. She will continue to monitor if she tolerates the dosage well, and make further adjustments when necessary. Risk and benefits were discussed with the patient in detail.

## 2023-11-16 NOTE — ASSESSMENT & PLAN NOTE
She has the [employer] 2 sensor and reader, and she checks her glucose levels periodically throughout the day. It is very helpful in preventing hypoglycemia and following hyperglycemia. Average fasting is 128 mg/dL. Discussed that her levels should not maintain in the range of 200 mg/dL. She has an erratic work schedule, has unconventional mealtimes and unconventional activity levels. She is recovering from ovarian cancer and multiple surgeries -- remotely a few years ago. She is concerned about nocturnal hypoglycemia and is highly motivated to achieve and maintain ideal glycemic control. She is going on Mounjaro to monitor even tighter blood sugar control therefore; I am recommending the continuous glucose monitor in the style of [employer] [place] 2 which she has been using on her own very consistently.

## 2023-11-16 NOTE — ASSESSMENT & PLAN NOTE
She will start taking Lipitor and discussed that she may take it anytime. She will take 2 metformin daily at dinner time.

## 2023-12-20 ENCOUNTER — TELEPHONE (OUTPATIENT)
Age: 58
End: 2023-12-20

## 2023-12-20 NOTE — TELEPHONE ENCOUNTER
Spoke with Marcella. She stated that her  tested positive last Thursday and she tested positive today. She wanted to know if Dr Hamilton could prescribe med for her.  Warm transfer to office and I was informed that Dr Hamilton is out sick.  Patient asked for me to send a note to provider so he can contact her as soon as he is able to.

## 2023-12-20 NOTE — TELEPHONE ENCOUNTER
Called pt and advised her that dr cortez is not here and that we advise she go to urgent care or ED, she stated she would decide

## 2023-12-21 ENCOUNTER — OFFICE VISIT (OUTPATIENT)
Dept: URGENT CARE | Facility: CLINIC | Age: 58
End: 2023-12-21
Payer: MEDICARE

## 2023-12-21 VITALS
HEART RATE: 89 BPM | DIASTOLIC BLOOD PRESSURE: 94 MMHG | SYSTOLIC BLOOD PRESSURE: 169 MMHG | BODY MASS INDEX: 42.06 KG/M2 | HEIGHT: 63 IN | WEIGHT: 237.4 LBS | TEMPERATURE: 98.6 F | OXYGEN SATURATION: 96 %

## 2023-12-21 DIAGNOSIS — U07.1 COVID: Primary | ICD-10-CM

## 2023-12-21 PROCEDURE — G0463 HOSPITAL OUTPT CLINIC VISIT: HCPCS

## 2023-12-21 PROCEDURE — 99213 OFFICE O/P EST LOW 20 MIN: CPT

## 2023-12-21 RX ORDER — NIRMATRELVIR AND RITONAVIR 300-100 MG
3 KIT ORAL 2 TIMES DAILY
Qty: 30 TABLET | Refills: 0 | Status: SHIPPED | OUTPATIENT
Start: 2023-12-21 | End: 2023-12-26
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
## 2023-12-21 NOTE — PATIENT INSTRUCTIONS
Vitamin D3 2000 IU daily.  Vitamin C 1000mg twice per day.  Multivitamin daily.  Some studies suggest that Zinc 12.5-15mg every 2 hours while awake x 5 days may shorten the duration cold symptoms by 1-2 days.   Fluids and rest.  Nasal saline spray; Afrin if severe congestion (do not use for more than 3 days)  Over the counter cough/cold medications as needed.   Flonase nasal spray.  Tylenol/Ibuprofen for pain/fever.  Salt water gargles and/or chloraseptic spray.  Warm tea with honey.  Warm compresses over sinuses.  Nasal rinses with distilled water.     Follow up with PCP in 3-5 days.  Proceed to the ER with worsening symptoms.     Nirmatrelvir/Ritonavir (By mouth)   Treats coronavirus disease 2019 (COVID-19).  Brand Name(s): Paxlovid 150MG;100MG Dose Pack, Paxlovid 300MG;100MG Dose Pack   There may be other brand names for this medicine.  When This Medicine Should Not Be Used:   This medicine is not right for everyone. Do not use it if you had an allergic reaction to nirmatrelvir or ritonavir.  How to Use This Medicine:   Tablet, Tablet  Your doctor will tell you how much medicine to use. Do not use more than directed. Take this medicine within 5 days of the onset of COVID-19 symptoms.  Swallow the tablet whole. Do not crush, break, or chew it.  This medicine comes with a Fact Sheet for Patients, Parents, and Caregivers. Read and follow the information in the Fact Sheet carefully. Ask your doctor or pharmacist if you have any questions.  Missed dose: If it is within 8 hours of the time it is usually taken, take it as soon as possible, and then go back to your regular schedule. If it is more than 8 hours, skip the missed dose and take your next dose at the regular time. Do not use extra medicine to make up for a missed dose.  Store the medicine in a closed container at room temperature, away from heat, moisture, and direct light.  Drugs and Foods to Avoid:   Ask your doctor or pharmacist before using any other  medicine, including over-the-counter medicines, vitamins, and herbal products.  Do not use this medicine if you are also using alfuzosin, apalutamide, colchicine, eletriptan, eplerenone, finerenone, flibanserin, ivabradine, lomitapide, lovastatin, lumacaftor/ivacaftor, lurasidone, midazolam, naloxegol, pethidine, pimozide, ranolazine, rifampin, sildenafil, silodosin, simvastatin, Nathaniel's wort, tolvaptan, triazolam, ubrogepant, voclosporin, ergot medicine (including dihydroergotamine, ergotamine, methylergonovine), medicine for heart rhythm problems (including amiodarone, dronedarone, flecainide, propafenone, quinidine), or seizure medicine (including carbamazepine, phenobarbital, phenytoin, primidone).  Some medicines can affect how nirmatrelvir/ritonavir works. Tell your doctor if you are using any of the following:  Aliskiren, atorvastatin, avanafil, bedaquiline, bosentan, clopidogrel, clozapine, cyclosporine, dasabuvir, digoxin, elbasvir/grazoprevir, elexacaftor/tezacaftor/ivacaftor, everolimus, fentanyl, glecaprevir/pibrentasvir, hydrocodone, ivacaftor, lidocaine, methadone, ombitasvir/paritaprevir/ritonavir, oxycodone, quetiapine, rifabutin, rifapentine, rimegepant, rosuvastatin, salmeterol, sirolimus, sofosbuvir/velpatasvir/voxilaprevir, suvorexant, tacrolimus, tadalafil, tamsulosin, tezacaftor/ivacaftor, ticagrelor, vardenafil, vorapaxar  Birth control pills (including ethinyl estradiol)  Blood pressure medicine (including amlodipine, diltiazem, felodipine, nicardipine, nifedipine)  Blood thinner (including dabigatran, rivaroxaban, warfarin)  Medicine to treat cancer (including abemaciclib, ceritinib, dasatinib, encorafenib, ibrutinib, ivosidenib, neratinib, nilotinib, venetoclax, vinblastine, vincristine)  Medicine to treat depression (including bupropion, trazodone)  Medicine to treat HIV (including atazanavir, bictegravir/emtricitabine/tenofovir, darunavir, efavirenz, maraviroc, tipranavir,  zidovudine)  Medicine to treat infections (including clarithromycin, erythromycin, isavuconazonium, itraconazole, ketoconazole, voriconazole)  Steroid medicine (including betamethasone, budesonide, ciclesonide, dexamethasone, fluticasone, methylprednisolone, mometasone, triamcinolone)  Warnings While Using This Medicine:   Tell your doctor if you are pregnant or planning to become pregnant. Birth control pills may not work as well to prevent pregnancy when used with this medicine. Use another form of birth control (including condoms or spermicide) along with your pills.  Tell your doctor if you are breastfeeding, or if you have kidney disease, liver disease, or HIV infection.  This medicine may cause the following problems:  Serious skin reactions, including toxic epidermal necrolysis, Nogueira-Yaw syndrome  Liver problems  Your doctor will do lab tests at regular visits to check on the effects of this medicine. Keep all appointments.  Keep all medicine out of the reach of children. Never share your medicine with anyone.  Possible Side Effects While Using This Medicine:   If you notice these less serious side effects, talk with your doctor:  Allergic reaction: Itching or hives, swelling in your face or hands, swelling or tingling in your mouth or throat, chest tightness, trouble breathing  Blistering, peeling, red skin rash  Blurred vision, severe headache, slow or fast heartbeat, lightheadedness, dizziness  Dark urine or pale stools, nausea, vomiting, loss of appetite, stomach pain, yellow skin or eyes  If you notice these less serious side effects, talk with your doctor:  Change or loss of taste  Diarrhea  Muscle pain  If you notice other side effects that you think are caused by this medicine, tell your doctor.   Call your doctor for medical advice about side effects. You may report side effects to FDA at 7-376-FDA-8590  © Copyright Merative 2023 Information is for End User's use only and may not be sold,  redistributed or otherwise used for commercial purposes.  The above information is an  only. It is not intended as medical advice for individual conditions or treatments. Talk to your doctor, nurse or pharmacist before following any medical regimen to see if it is safe and effective for you.

## 2023-12-22 ENCOUNTER — TELEPHONE (OUTPATIENT)
Dept: FAMILY MEDICINE CLINIC | Facility: CLINIC | Age: 58
End: 2023-12-22

## 2023-12-22 NOTE — TELEPHONE ENCOUNTER
Pt was Rx paxlovid yesterday -- no longer need for additional Rx at this time.     Discussed w pt she is aware no statin while on this Rx.

## 2023-12-22 NOTE — TELEPHONE ENCOUNTER
----- Message from WINDY Hamilton DO sent at 12/22/2023 12:07 AM EST -----  Regarding: FW: Covid  Contact: [phone]  J- just saw this message 12:05 a.m. Fri (today).  Go ahead and call in Rx Paxlovid  1 box (5 day supply). Sig:  as directed. Tell her to drink loads of water and READ THE PACKAGE INSERT to understand what drug effects to watch for. If on a statin, hold that for a week.  ----- Message -----  From: Devorah Mac LPN  Sent: 12/20/2023   2:34 PM EST  To: WINDY Hamilton DO  Subject: FW: Covid                                        Please advise what you'd like to do.  ----- Message -----  From: Xochitl Villafuerte  Sent: 12/20/2023   2:23 PM EST  To: [facility]  Subject: FW: Covid                                        Please advise if VV needed   ----- Message -----  From: Marcella Walls  Sent: 12/20/2023   8:37 AM EST  To: [facility]  Subject: Covid                                            My  Herve has Covid.  He went to the [facility] in [place] yesterday and was given Paxlovid and an Inhaler.  Now I have tested positive (2 tests) as well.   Since I was just in to see you, and since he is out of commission to drive me, could you please send in a prescription to [facility] (my usual pharmacy) so I can get on it ASAP?   I was just in to see you recently, and we both had our Covid boosters in late October/early November,  but here we are.  My cell is [phone].  Thank you!

## 2023-12-29 ENCOUNTER — TELEPHONE (OUTPATIENT)
Age: 58
End: 2023-12-29

## 2023-12-29 NOTE — TELEPHONE ENCOUNTER
Positive covid 12/21 and pt was given paxlovid and wants to know if she can get a script for zpack or something to help with the lingering symptoms.  Please call pt if addressed so she knows ot  medication.

## 2024-01-02 ENCOUNTER — OFFICE VISIT (OUTPATIENT)
Dept: FAMILY MEDICINE CLINIC | Facility: CLINIC | Age: 59
End: 2024-01-02
Payer: MEDICARE

## 2024-01-02 VITALS
OXYGEN SATURATION: 97 % | HEIGHT: 63 IN | WEIGHT: 265 LBS | DIASTOLIC BLOOD PRESSURE: 94 MMHG | SYSTOLIC BLOOD PRESSURE: 136 MMHG | HEART RATE: 96 BPM | TEMPERATURE: 98.4 F | BODY MASS INDEX: 46.95 KG/M2

## 2024-01-02 DIAGNOSIS — Z71.2 ENCOUNTER TO DISCUSS TEST RESULTS: ICD-10-CM

## 2024-01-02 DIAGNOSIS — U07.1 COVID-19: Primary | ICD-10-CM

## 2024-01-02 DIAGNOSIS — Z79.899 MEDICATION MANAGEMENT: ICD-10-CM

## 2024-01-02 DIAGNOSIS — Z79.899 ENCOUNTER FOR LONG-TERM (CURRENT) USE OF MEDICATIONS: ICD-10-CM

## 2024-01-02 PROCEDURE — 99214 OFFICE O/P EST MOD 30 MIN: CPT | Performed by: FAMILY MEDICINE

## 2024-01-02 RX ORDER — AZITHROMYCIN 250 MG/1
TABLET, FILM COATED ORAL DAILY
Qty: 6 TABLET | Refills: 0 | Status: SHIPPED | OUTPATIENT
Start: 2024-01-02 | End: 2024-01-06

## 2024-01-02 RX ORDER — DEXTROMETHORPHAN HYDROBROMIDE AND PROMETHAZINE HYDROCHLORIDE 15; 6.25 MG/5ML; MG/5ML
5 SYRUP ORAL 4 TIMES DAILY PRN
Qty: 180 ML | Refills: 0 | Status: SHIPPED | OUTPATIENT
Start: 2024-01-02

## 2024-01-02 NOTE — PROGRESS NOTES
Assessment/Plan:          1. COVID-19  Assessment & Plan:  She tested positive for COVID-19 on 12/21/2023. She is doing better, but not over it. She still has a lot of head congestion and chest congestion. She will begin a Z-Ed and promethazine 1 teaspoon 4 times a day.    Orders:  -     azithromycin (Zithromax) 250 mg tablet; Take 2 tablets (500 mg total) by mouth daily for 1 day, THEN 1 tablet (250 mg total) daily for 4 days.  -     promethazine-dextromethorphan (PHENERGAN-DM) 6.25-15 mg/5 mL oral syrup; Take 5 mL by mouth 4 (four) times a day as needed for cough    2. Encounter to discuss test results  Assessment & Plan:  All test results reviewed.      3. Medication management  Assessment & Plan:  All reviewed.       4. Encounter for long-term (current) use of medications                    Subjective:       Patient ID: Marcella Walls is a 58 y.o. female who presents for evaluation of COVID-19 symptoms.    She tested positive for COVID-19 on 12/21/2023. The test was conducted at home due to a shortage of testing kits at the urgent care in [place], where she initially sought testing but was unable to get tested. She has cough, nasal congestion, post-nasal drip, sinus pressure, and chest congestion. Additionally, she has a persistent inability to breathe through the nose. She notes an improvement in her condition since the onset of symptoms, but not over it. She reports absence of expectoration of phlegm or mucus. She initiated Paxlovid treatment immediately after visiting the urgent care facility.     She has a history of ovarian cancer and lymphedema. She mentions a habit of not walking barefoot.    She received her COVID-19 booster at the end of 10/2023. She has received her influenza vaccine.    The following portions of the patient's history were reviewed and updated as appropriate: allergies, current medications, past family history, past medical history, past social history, past surgical  "history, and problem list.          Objective:       /94 (BP Location: Left arm, Patient Position: Sitting, Cuff Size: Standard)   Pulse 96   Temp 98.4 °F (36.9 °C) (Temporal)   Ht 5' 3\" (1.6 m)   Wt 120 kg (265 lb)   SpO2 97%   BMI 46.94 kg/m²          Physical Exam  Vitals and nursing note reviewed.   Constitutional:       General: She is not in acute distress.     Appearance: Normal appearance. She is well-developed.   HENT:      Head: Normocephalic and atraumatic.   Eyes:      General:         Right eye: No discharge.         Left eye: No discharge.   Neck:      Thyroid: No thyromegaly.   Cardiovascular:      Rate and Rhythm: Normal rate and regular rhythm.      Pulses: Normal pulses.      Heart sounds: Normal heart sounds. No murmur heard.  Pulmonary:      Effort: Pulmonary effort is normal.      Breath sounds: Normal breath sounds. No wheezing or rhonchi.   Musculoskeletal:      Cervical back: Neck supple.      Right lower leg: No edema.      Left lower leg: No edema.   Lymphadenopathy:      Cervical: No cervical adenopathy.   Skin:     General: Skin is warm.      Capillary Refill: Capillary refill takes less than 2 seconds.   Neurological:      General: No focal deficit present.      Mental Status: She is alert and oriented to person, place, and time.   Psychiatric:         Mood and Affect: Mood normal.         Behavior: Behavior normal.         Thought Content: Thought content normal.          I personally reviewed the recent (and prior)  lab results, the image studies, pathology, other specialty/physicians consult notes and recommendations, and outside medical records from other institutions, as appropriate. I had a lengthy discussion with the patient and shared the work-up findings. We discussed the diagnosis and management plan.  I spent  30 minutes reviewing the records (labs, clinician notes, outside records, medical history, ordering medicine/tests/procedures, interpreting the imaging/labs " previously done) and coordination of care as well as direct time with the patient today, of which greater than 50% of the time was spent in counseling and coordination of care with the patient/family.    Transcribed for WINDY Hamilton DO, by Terrell  on 01/06/24 at 2:54 PM. Powered by Dragon Ambient e[employer]ience.

## 2024-01-03 NOTE — ASSESSMENT & PLAN NOTE
She tested positive for COVID-19 on 12/21/2023. She is doing better, but not over it. She still has a lot of head congestion and chest congestion. She will begin a Z-Ed and promethazine 1 teaspoon 4 times a day.

## 2024-01-08 DIAGNOSIS — U07.1 COVID-19: ICD-10-CM

## 2024-01-08 RX ORDER — DEXTROMETHORPHAN HYDROBROMIDE AND PROMETHAZINE HYDROCHLORIDE 15; 6.25 MG/5ML; MG/5ML
5 SYRUP ORAL 4 TIMES DAILY PRN
Qty: 180 ML | Refills: 0 | Status: SHIPPED | OUTPATIENT
Start: 2024-01-08

## 2024-01-12 ENCOUNTER — VBI (OUTPATIENT)
Dept: ADMINISTRATIVE | Facility: OTHER | Age: 59
End: 2024-01-12

## 2024-01-12 NOTE — TELEPHONE ENCOUNTER
01/12/24 11:25 AM     [employer] SmartForm used to document caregap status.    Srinivas Anderson MA

## 2024-01-14 PROBLEM — Z00.00 MEDICARE ANNUAL WELLNESS VISIT, SUBSEQUENT: Status: RESOLVED | Noted: 2023-11-15 | Resolved: 2024-01-14

## 2024-01-29 ENCOUNTER — TELEPHONE (OUTPATIENT)
Dept: DIABETES SERVICES | Facility: CLINIC | Age: 59
End: 2024-01-29

## 2024-01-29 NOTE — TELEPHONE ENCOUNTER
Called and spoke to Marcella regarding the progress of goals set for the Living Well with Diabetes program follow-up.

## 2024-02-08 ENCOUNTER — RA CDI HCC (OUTPATIENT)
Dept: OTHER | Facility: HOSPITAL | Age: 59
End: 2024-02-08

## 2024-02-08 PROBLEM — U07.1 COVID-19: Status: RESOLVED | Noted: 2024-01-02 | Resolved: 2024-02-08

## 2024-02-09 DIAGNOSIS — I10 ESSENTIAL HYPERTENSION: ICD-10-CM

## 2024-02-09 DIAGNOSIS — E55.9 VITAMIN D DEFICIENCY: ICD-10-CM

## 2024-02-09 DIAGNOSIS — E78.2 MIXED HYPERLIPIDEMIA: ICD-10-CM

## 2024-02-09 DIAGNOSIS — E11.65 TYPE 2 DIABETES MELLITUS WITH HYPERGLYCEMIA, WITHOUT LONG-TERM CURRENT USE OF INSULIN (HCC): ICD-10-CM

## 2024-02-09 RX ORDER — ACETAMINOPHEN 160 MG
2000 TABLET,DISINTEGRATING ORAL DAILY
Qty: 90 CAPSULE | Refills: 0 | Status: SHIPPED | OUTPATIENT
Start: 2024-02-09

## 2024-02-14 ENCOUNTER — TELEPHONE (OUTPATIENT)
Dept: ADMINISTRATIVE | Facility: OTHER | Age: 59
End: 2024-02-14

## 2024-02-14 NOTE — TELEPHONE ENCOUNTER
02/14/24 2:43 PM    Patient contacted (spoke with patient) to bring Advance Directive, POLST, or Living Will document to next scheduled pcp visit.    Thank you.  Xena May  [facility]

## 2024-04-16 DIAGNOSIS — R60.0 LOCALIZED EDEMA: ICD-10-CM

## 2024-04-16 RX ORDER — SPIRONOLACTONE 100 MG/1
100 TABLET, FILM COATED ORAL EVERY MORNING
Qty: 90 TABLET | Refills: 1 | Status: SHIPPED | OUTPATIENT
Start: 2024-04-16

## 2024-04-25 LAB
LEFT EYE DIABETIC RETINOPATHY: NORMAL
RIGHT EYE DIABETIC RETINOPATHY: NORMAL

## 2024-05-09 DIAGNOSIS — E11.65 TYPE 2 DIABETES MELLITUS WITH HYPERGLYCEMIA, WITHOUT LONG-TERM CURRENT USE OF INSULIN (HCC): ICD-10-CM

## 2024-05-09 DIAGNOSIS — E55.9 VITAMIN D DEFICIENCY: ICD-10-CM

## 2024-05-09 DIAGNOSIS — E78.2 MIXED HYPERLIPIDEMIA: ICD-10-CM

## 2024-05-09 DIAGNOSIS — I10 ESSENTIAL HYPERTENSION: ICD-10-CM

## 2024-05-09 RX ORDER — ACETAMINOPHEN 160 MG
2000 TABLET,DISINTEGRATING ORAL DAILY
Qty: 90 CAPSULE | Refills: 1 | Status: SHIPPED | OUTPATIENT
Start: 2024-05-09

## 2024-06-01 DIAGNOSIS — E11.65 TYPE 2 DIABETES MELLITUS WITH HYPERGLYCEMIA, WITHOUT LONG-TERM CURRENT USE OF INSULIN (HCC): ICD-10-CM

## 2024-06-03 RX ORDER — TIRZEPATIDE 5 MG/.5ML
INJECTION, SOLUTION SUBCUTANEOUS
Qty: 2 ML | Refills: 0 | Status: SHIPPED | OUTPATIENT
Start: 2024-06-03

## 2024-06-04 ENCOUNTER — TELEPHONE (OUTPATIENT)
Dept: FAMILY MEDICINE CLINIC | Facility: CLINIC | Age: 59
End: 2024-06-04

## 2024-06-12 ENCOUNTER — VBI (OUTPATIENT)
Dept: ADMINISTRATIVE | Facility: OTHER | Age: 59
End: 2024-06-12

## 2024-06-13 NOTE — TELEPHONE ENCOUNTER
06/12/24 9:38 PM     VB [employer] SmartForm used to document caregap status.  Ce UPDATED  Farideh Goodrich

## 2024-07-03 DIAGNOSIS — E11.65 TYPE 2 DIABETES MELLITUS WITH HYPERGLYCEMIA, WITHOUT LONG-TERM CURRENT USE OF INSULIN (HCC): ICD-10-CM

## 2024-07-03 RX ORDER — TIRZEPATIDE 5 MG/.5ML
INJECTION, SOLUTION SUBCUTANEOUS
Qty: 2 ML | Refills: 0 | Status: SHIPPED | OUTPATIENT
Start: 2024-07-03

## 2024-07-25 ENCOUNTER — RA CDI HCC (OUTPATIENT)
Dept: OTHER | Facility: HOSPITAL | Age: 59
End: 2024-07-25

## 2024-07-31 ENCOUNTER — TELEPHONE (OUTPATIENT)
Dept: ADMINISTRATIVE | Facility: OTHER | Age: 59
End: 2024-07-31

## 2024-07-31 NOTE — TELEPHONE ENCOUNTER
07/31/24 10:28 AM    Patient contacted to bring Advance Directive, POLST, or Living Will document to next scheduled pcp visit.[facility] Department left message.    Thank you.  Aby Blair MA  [facility]

## 2024-08-01 ENCOUNTER — OFFICE VISIT (OUTPATIENT)
Dept: FAMILY MEDICINE CLINIC | Facility: CLINIC | Age: 59
End: 2024-08-01
Payer: MEDICARE

## 2024-08-01 VITALS
OXYGEN SATURATION: 96 % | TEMPERATURE: 97.9 F | SYSTOLIC BLOOD PRESSURE: 134 MMHG | WEIGHT: 262.6 LBS | HEART RATE: 85 BPM | BODY MASS INDEX: 46.53 KG/M2 | HEIGHT: 63 IN | DIASTOLIC BLOOD PRESSURE: 90 MMHG

## 2024-08-01 DIAGNOSIS — E11.65 TYPE 2 DIABETES MELLITUS WITH HYPERGLYCEMIA, WITHOUT LONG-TERM CURRENT USE OF INSULIN (HCC): Primary | ICD-10-CM

## 2024-08-01 DIAGNOSIS — E11.65 TYPE 2 DIABETES MELLITUS WITH HYPERGLYCEMIA, WITHOUT LONG-TERM CURRENT USE OF INSULIN (HCC): ICD-10-CM

## 2024-08-01 DIAGNOSIS — Z79.899 MEDICATION MANAGEMENT: ICD-10-CM

## 2024-08-01 DIAGNOSIS — C56.1 OVARIAN CA, RIGHT (HCC): ICD-10-CM

## 2024-08-01 DIAGNOSIS — B37.2 MONILIASIS, CUTANEOUS: ICD-10-CM

## 2024-08-01 DIAGNOSIS — E66.01 MORBID OBESITY (HCC): ICD-10-CM

## 2024-08-01 LAB — SL AMB POCT HEMOGLOBIN AIC: 6.8 (ref ?–6.5)

## 2024-08-01 PROCEDURE — 99214 OFFICE O/P EST MOD 30 MIN: CPT | Performed by: FAMILY MEDICINE

## 2024-08-01 PROCEDURE — 83036 HEMOGLOBIN GLYCOSYLATED A1C: CPT | Performed by: FAMILY MEDICINE

## 2024-08-01 RX ORDER — NYSTATIN 100000 [USP'U]/G
POWDER TOPICAL 2 TIMES DAILY
Qty: 60 G | Refills: 3 | Status: SHIPPED | OUTPATIENT
Start: 2024-08-01

## 2024-08-01 RX ORDER — TIRZEPATIDE 7.5 MG/.5ML
7.5 INJECTION, SOLUTION SUBCUTANEOUS WEEKLY
Qty: 6 ML | Refills: 1 | Status: SHIPPED | OUTPATIENT
Start: 2024-08-01

## 2024-08-01 RX ORDER — TIRZEPATIDE 5 MG/.5ML
INJECTION, SOLUTION SUBCUTANEOUS
Qty: 2 ML | Refills: 1 | Status: SHIPPED | OUTPATIENT
Start: 2024-08-01 | End: 2024-08-01

## 2024-08-01 NOTE — ASSESSMENT & PLAN NOTE
Lab Results   Component Value Date    HGBA1C 6.8 (A) 08/01/2024   Down frm 7.4 last determination    Orders:    POCT hemoglobin A1c    Mounjaro 7.5 MG/0.5ML; Inject 0.5 mL (7.5 mg total) under the skin once a week

## 2024-08-01 NOTE — PROGRESS NOTES
Diabetic Foot Exam    Patient's shoes and socks removed.    Right Foot/Ankle   Right Foot Inspection  Skin Exam: skin normal and skin intact. No dry skin, no warmth, no callus, no erythema, no maceration, no abnormal color, no pre-ulcer, no ulcer and no callus.     Toe Exam: ROM and strength within normal limits and swelling.     Sensory   Vibration: intact  Proprioception: intact  Monofilament testing: intact    Vascular  Capillary refills: < 3 seconds  The right DP pulse is 2+. The right PT pulse is 2+.     Left Foot/Ankle  Left Foot Inspection  Skin Exam: skin normal and skin intact. No dry skin, no warmth, no erythema, no maceration, normal color, no pre-ulcer, no ulcer and no callus.     Toe Exam: ROM and strength within normal limits and swelling.     Sensory   Vibration: intact  Proprioception: intact  Monofilament testing: intact    Vascular  Capillary refills: < 3 seconds  The left DP pulse is 2+. The left PT pulse is 2+.     Assign Risk Category  No deformity present  No loss of protective sensation  No weak pulses  Risk: 0

## 2024-08-01 NOTE — PROGRESS NOTES
Ambulatory Visit  Name: Marcella Walls      : 1965      MRN: [id number]  Encounter Provider: WINDY Hamilton DO  Encounter Date: 2024   Encounter department: [facility]    Assessment & Plan  Type 2 diabetes mellitus with hyperglycemia, without long-term current use of insulin (HCC)    Lab Results   Component Value Date    HGBA1C 6.8 (A) 2024   Down frm 7.4 last determination    Orders:    POCT hemoglobin A1c    Mounjaro 7.5 MG/0.5ML; Inject 0.5 mL (7.5 mg total) under the skin once a week    Moniliasis, cutaneous    Orders:    nystatin (MYCOSTATIN) powder; Apply topically 2 (two) times a day    Sicca, unspecified type (HCC)         Ovarian CA, right (HCC)         Morbid obesity (HCC)          1. Type 2 diabetes mellitus with hyperglycemia, without long-term current use of insulin (HCC)  Comments:  Currently in second month of Mounjaro 5 mg.  Never started 2.5 mg.  Will advance every 2 months.  Weight down about 10 pounds.  BMI 46.5  A1c 6.8 today last 7.4  Assessment & Plan:    Lab Results   Component Value Date    HGBA1C 6.8 (A) 2024       Orders:    POCT hemoglobin A1c    Mounjaro 7.5 MG/0.5ML; Inject 0.5 mL (7.5 mg total) under the skin once a week    Orders:  -     POCT hemoglobin A1c  -     Mounjaro 7.5 MG/0.5ML; Inject 0.5 mL (7.5 mg total) under the skin once a week  2. Moniliasis, cutaneous  Comments:  Request prescription for intertriginous candidiasis  Orders:  -     nystatin (MYCOSTATIN) powder; Apply topically 2 (two) times a day  3. Sicca, unspecified type (HCC)  Assessment & Plan:         4. Ovarian CA, right (HCC)  Comments:  Remote almost 10 years ago doing well  Assessment & Plan:         5. Morbid obesity (HCC)  Comments:  Lengthy discussion current BMI 46.5 to at 262 pounds on second months Mounjaro  Assessment & Plan:               History of Present Illness     History of Present Illness  The patient is a 59-year-old female who presents for  follow-up of diabetes.    She is currently using a [employer] Dana system for her diabetes management. Additionally, she is on a regimen of metformin, two tablets at supper, and Mounjaro 5 mg, which she tolerates well. However, she experiences nausea if she consumes certain foods, although she has not vomited. She has received four Mounjaro injections in the past 4 weeks.    She is seeking a refill of her nystatin powder for a rash located under her breasts.    SOCIAL HISTORY  She retired in 2014. She has never smoked. She drinks alcohol occasionally. She denies marijuana use.    FAMILY HISTORY  Her father  from a heart attack.     Review of Systems   Constitutional:  Negative for activity change, appetite change, chills, diaphoresis, fatigue and fever.   HENT:  Negative for congestion, ear discharge, ear pain, facial swelling, nosebleeds, sore throat, tinnitus, trouble swallowing and voice change.    Eyes:  Negative for photophobia, pain, discharge, redness, itching and visual disturbance.   Respiratory:  Negative for apnea, cough, choking, chest tightness and shortness of breath.    Cardiovascular:  Negative for chest pain, palpitations and leg swelling.   Gastrointestinal:  Negative for abdominal distention, abdominal pain, blood in stool, constipation, diarrhea, nausea and vomiting.   Endocrine: Negative for cold intolerance, heat intolerance, polydipsia, polyphagia and polyuria.   Genitourinary:  Negative for decreased urine volume, difficulty urinating, dysuria, enuresis, frequency, hematuria, pelvic pain, urgency and vaginal bleeding.   Musculoskeletal:  Negative for arthralgias, back pain, gait problem, joint swelling, neck pain and neck stiffness.   Skin:  Negative for color change, pallor and rash.   Allergic/Immunologic: Negative for immunocompromised state.   Neurological:  Negative for dizziness, seizures, facial asymmetry, light-headedness, numbness and headaches.   Hematological:  Negative for  "adenopathy.   Psychiatric/Behavioral:  Negative for agitation, behavioral problems, confusion, decreased concentration, dysphoric mood and hallucinations.      Objective     /90 (BP Location: Left arm, Patient Position: Sitting, Cuff Size: Standard)   Pulse 85   Temp 97.9 °F (36.6 °C) (Temporal)   Ht 5' 3\" (1.6 m)   Wt 119 kg (262 lb 9.6 oz)   SpO2 96%   BMI 46.52 kg/m²     Physical Exam    Physical Exam  Vitals and nursing note reviewed.   Constitutional:       General: She is not in acute distress.     Appearance: Normal appearance. She is obese. She is not ill-appearing, toxic-appearing or diaphoretic.   HENT:      Head: Normocephalic.      Nose: Nose normal.      Mouth/Throat:      Mouth: Mucous membranes are moist.      Pharynx: No oropharyngeal exudate or posterior oropharyngeal erythema.   Eyes:      Extraocular Movements: Extraocular movements intact.      Pupils: Pupils are equal, round, and reactive to light.   Cardiovascular:      Rate and Rhythm: Normal rate and regular rhythm.      Pulses: Normal pulses.      Heart sounds: Normal heart sounds.      No gallop.   Pulmonary:      Effort: No respiratory distress.      Breath sounds: Normal breath sounds. No wheezing, rhonchi or rales.   Abdominal:      General: Abdomen is flat.   Musculoskeletal:         General: No tenderness.      Cervical back: Normal range of motion and neck supple.      Right lower leg: No edema.      Left lower leg: No edema.   Skin:     General: Skin is warm.   Neurological:      General: No focal deficit present.      Mental Status: She is alert and oriented to person, place, and time. Mental status is at baseline.   Psychiatric:         Mood and Affect: Mood normal.         Behavior: Behavior normal.         Thought Content: Thought content normal.         Judgment: Judgment normal.         "

## 2024-08-11 DIAGNOSIS — E11.65 TYPE 2 DIABETES MELLITUS WITH HYPERGLYCEMIA, WITHOUT LONG-TERM CURRENT USE OF INSULIN (HCC): ICD-10-CM

## 2024-08-11 DIAGNOSIS — E78.2 MIXED HYPERLIPIDEMIA: ICD-10-CM

## 2024-08-11 DIAGNOSIS — I10 ESSENTIAL HYPERTENSION: ICD-10-CM

## 2024-08-12 RX ORDER — ATORVASTATIN CALCIUM 10 MG/1
10 TABLET, FILM COATED ORAL DAILY
Qty: 90 TABLET | Refills: 1 | Status: SHIPPED | OUTPATIENT
Start: 2024-08-12

## 2024-09-22 DIAGNOSIS — E11.9 TYPE 2 DIABETES MELLITUS WITHOUT COMPLICATION, WITHOUT LONG-TERM CURRENT USE OF INSULIN (HCC): ICD-10-CM

## 2024-09-23 RX ORDER — METFORMIN HCL 500 MG
TABLET, EXTENDED RELEASE 24 HR ORAL
Qty: 180 TABLET | Refills: 1 | Status: SHIPPED | OUTPATIENT
Start: 2024-09-23

## 2024-10-06 DIAGNOSIS — B37.2 MONILIASIS, CUTANEOUS: ICD-10-CM

## 2024-10-07 RX ORDER — NYSTATIN 100000 [USP'U]/G
1 POWDER TOPICAL 2 TIMES DAILY
Qty: 60 G | Refills: 0 | Status: SHIPPED | OUTPATIENT
Start: 2024-10-07

## 2024-10-08 DIAGNOSIS — R60.0 LOCALIZED EDEMA: ICD-10-CM

## 2024-10-09 RX ORDER — SPIRONOLACTONE 100 MG/1
100 TABLET, FILM COATED ORAL EVERY MORNING
Qty: 90 TABLET | Refills: 1 | Status: SHIPPED | OUTPATIENT
Start: 2024-10-09

## 2024-10-22 DIAGNOSIS — E11.65 TYPE 2 DIABETES MELLITUS WITH HYPERGLYCEMIA, WITHOUT LONG-TERM CURRENT USE OF INSULIN (HCC): ICD-10-CM

## 2024-11-07 DIAGNOSIS — E55.9 VITAMIN D DEFICIENCY: ICD-10-CM

## 2024-11-07 DIAGNOSIS — E78.2 MIXED HYPERLIPIDEMIA: ICD-10-CM

## 2024-11-07 DIAGNOSIS — E11.65 TYPE 2 DIABETES MELLITUS WITH HYPERGLYCEMIA, WITHOUT LONG-TERM CURRENT USE OF INSULIN (HCC): ICD-10-CM

## 2024-11-07 DIAGNOSIS — I10 ESSENTIAL HYPERTENSION: ICD-10-CM

## 2024-11-07 RX ORDER — ACETAMINOPHEN 160 MG
2000 TABLET,DISINTEGRATING ORAL DAILY
Qty: 90 CAPSULE | Refills: 1 | Status: SHIPPED | OUTPATIENT
Start: 2024-11-07

## 2024-11-12 ENCOUNTER — PATIENT MESSAGE (OUTPATIENT)
Dept: FAMILY MEDICINE CLINIC | Facility: CLINIC | Age: 59
End: 2024-11-12

## 2024-11-16 DIAGNOSIS — B37.2 MONILIASIS, CUTANEOUS: ICD-10-CM

## 2024-11-18 RX ORDER — NYSTATIN 100000 [USP'U]/G
1 POWDER TOPICAL 2 TIMES DAILY
Qty: 60 G | Refills: 0 | Status: SHIPPED | OUTPATIENT
Start: 2024-11-18

## 2024-11-26 LAB
CREAT ?TM UR-SCNC: 212.1 UMOL/L
EXT ALBUMIN URINE RANDOM: 2.1
HBA1C MFR BLD HPLC: 6.2 %
MICROALBUMIN/CREAT UR: 9.9 MG/G{CREAT}

## 2024-11-27 ENCOUNTER — RA CDI HCC (OUTPATIENT)
Dept: OTHER | Facility: HOSPITAL | Age: 59
End: 2024-11-27

## 2024-12-04 ENCOUNTER — OFFICE VISIT (OUTPATIENT)
Dept: FAMILY MEDICINE CLINIC | Facility: CLINIC | Age: 59
End: 2024-12-04
Payer: MEDICARE

## 2024-12-04 VITALS
BODY MASS INDEX: 44.51 KG/M2 | TEMPERATURE: 98 F | HEIGHT: 63 IN | WEIGHT: 251.2 LBS | DIASTOLIC BLOOD PRESSURE: 96 MMHG | HEART RATE: 90 BPM | OXYGEN SATURATION: 97 % | SYSTOLIC BLOOD PRESSURE: 130 MMHG

## 2024-12-04 DIAGNOSIS — G47.33 OSA (OBSTRUCTIVE SLEEP APNEA): ICD-10-CM

## 2024-12-04 DIAGNOSIS — Z00.00 ENCOUNTER FOR MEDICARE ANNUAL WELLNESS EXAM: ICD-10-CM

## 2024-12-04 DIAGNOSIS — E66.01 MORBID OBESITY (HCC): ICD-10-CM

## 2024-12-04 DIAGNOSIS — Z11.59 NEED FOR HEPATITIS C SCREENING TEST: ICD-10-CM

## 2024-12-04 DIAGNOSIS — I10 ESSENTIAL HYPERTENSION: Primary | ICD-10-CM

## 2024-12-04 DIAGNOSIS — M35.00 SICCA, UNSPECIFIED TYPE (HCC): ICD-10-CM

## 2024-12-04 PROCEDURE — G0439 PPPS, SUBSEQ VISIT: HCPCS | Performed by: FAMILY MEDICINE

## 2024-12-04 PROCEDURE — 99214 OFFICE O/P EST MOD 30 MIN: CPT | Performed by: FAMILY MEDICINE

## 2024-12-04 NOTE — PROGRESS NOTES
Name: Marcella Walls      : 1965      MRN: [id number]  Encounter Provider: WINDY Hamilton DO  Encounter Date: 2024   Encounter department: [facility] [place]    Assessment & Plan  Essential hypertension  Blood pressure controlled at 130/96 continue present plan of-diet weight loss exercise etc.       MACRINA (obstructive sleep apnea)  Continue lose weight already lost 20 pounds       Encounter for Medicare annual wellness exam  This was able to be added on as a complementary and to this visit       Morbid obesity (HCC)  Weight 271 pounds November 15, 2023 that is 1 year ago today 251 pounds she is down 20 pounds she has been taking the Mounjaro about 4 months maybe 5 months now she is taking 7.5 mg/week and will continue that she has some diarrhea for the first 2 days after taking this but she is also taking metformin 2 tablets at bedtime her A1c is 6 point and her fasting blood sugar 108.  I think we can work to discontinue the metformin which may be the cause of her diarrhea in conjunction with the Mounjaro if not I have told her to take Imodium AD 2 caps on the day of taking the Mounjaro and perhaps a second day as well.  She is doing well we will continue that I would like to see her lose another 20 pounds.  I would like to see her continue with stricter diet and more activity.  She is not taking atorvastatin but I have urged her to go back on that having explained in detail her cholesterol and LDL levels and that they could be better although they are not terrible.         Need for hepatitis C screening test            Preventive health issues were discussed with patient, and age appropriate screening tests were ordered as noted in patient's After Visit Summary. Personalized health advice and appropriate referrals for health education or preventive services given if needed, as noted in patient's After Visit Summary.    History of Present Illness     HPI   Patient Care Team:  WINDY Miller  DO Alfonso as PCP - General (Family Medicine)  MD Herve Jones MD Rajkumar Katara, MD Richard Simeone, ZULEIKA Baptiste RD as Diabetes Educator (Dietician)    Review of Systems  Medical History Reviewed by provider this encounter:       Annual Wellness Visit Questionnaire   Marcella is here for her Subsequent Wellness visit. Last Medicare Wellness visit information reviewed, patient interviewed and updates made to the record today.      Health Risk Assessment:   Patient rates overall health as fair. Patient feels that their physical health rating is slightly worse. Patient is satisfied with their life. Eyesight was rated as same. Hearing was rated as same. Patient feels that their emotional and mental health rating is same. Patients states they are never, rarely angry. Patient states they are never, rarely unusually tired/fatigued. Pain experienced in the last 7 days has been some. Patient's pain rating has been 9/10. Patient states that she has experienced no weight loss or gain in last 6 months. LLE pain --9/10 at its worst     Depression Screening:   PHQ-2 Score: 0      Fall Risk Screening:   In the past year, patient has experienced: no history of falling in past year      Urinary Incontinence Screening:   Patient has not leaked urine accidently in the last six months.     Home Safety:  Patient does not have trouble with stairs inside or outside of their home. Patient has working smoke alarms and has working carbon monoxide detector. Home safety hazards include: none.     Nutrition:   Current diet is Regular, Limited junk food and Other (please comment). Pt has regular protein intake, as well as fruits and vegetables.     Medications:   Patient is currently taking over-the-counter supplements. OTC medications include: see medication list. Patient is able to manage medications.     Activities of Daily Living (ADLs)/Instrumental Activities of Daily Living (IADLs):   Walk and transfer into and out  of bed and chair?: Yes  Dress and groom yourself?: Yes    Bathe or shower yourself?: Yes    Feed yourself? Yes  Do your laundry/housekeeping?: Yes  Manage your money, pay your bills and track your expenses?: Yes  Make your own meals?: Yes    Do your own shopping?: Yes    Previous Hospitalizations:   Any hospitalizations or ED visits within the last 12 months?: No      Advance Care Planning:   Living will: No    Durable POA for healthcare: No    Advanced directive: No    Advanced directive counseling given: Yes    ACP document given: Yes    Patient declined ACP directive: No    End of Life Decisions reviewed with patient: Yes    Provider agrees with end of life decisions: Yes      Cognitive Screening:   Provider or family/friend/caregiver concerned regarding cognition?: No    PREVENTIVE SCREENINGS      Cardiovascular Screening:    General: Screening Not Indicated, History Lipid Disorder, Risks and Benefits Discussed and Screening Current      Diabetes Screening:     General: Screening Not Indicated, History Diabetes, Risks and Benefits Discussed and Screening Current      Colorectal Cancer Screening:     General: Risks and Benefits Discussed      Breast Cancer Screening:     General: Screening Current and Risks and Benefits Discussed      Cervical Cancer Screening:    General: Risks and Benefits Discussed and Screening Not Indicated      Osteoporosis Screening:    General: Risks and Benefits Discussed and Screening Current      Abdominal Aortic Aneurysm (AAA) Screening:        General: Risks and Benefits Discussed      Lung Cancer Screening:     General: Screening Not Indicated and Risks and Benefits Discussed      Hepatitis C Screening:    General: Risks and Benefits Discussed    Hep C Screening Accepted: Yes      Screening, Brief Intervention, and Referral to Treatment (SBIRT)    Screening  Typical number of drinks in a day: 0  Typical number of drinks in a week: 0  Interpretation: Low risk drinking  behavior.    Single Item Drug Screening:  How often have you used an illegal drug (including marijuana) or a prescription medication for non-medical reasons in the past year? never    Single Item Drug Screen Score: 0  Interpretation: Negative screen for possible drug use disorder    Brief Intervention  Alcohol & drug use screenings were reviewed. No concerns regarding substance use disorder identified. Healthy alcohol use/limits discussed.     Other Counseling Topics:   Car/seat belt/driving safety, skin self-exam, sunscreen and calcium and vitamin D intake and regular weightbearing exercise.     Social Drivers of Health     Financial Resource Strain: Low Risk  (11/15/2023)    Overall Financial Resource Strain (CARDIA)     Difficulty of Paying Living Expenses: Not hard at all   Transportation Needs: No Transportation Needs (11/15/2023)    PRAPARE - Transportation     Lack of Transportation (Medical): No     Lack of Transportation (Non-Medical): No    Received from [employer]     No results found.    Objective   There were no vitals taken for this visit.    Physical Exam  Administrative Statements   I have spent a total time of 40 minutes in caring for this patient on the day of the visit/encounter including Diagnostic results, Prognosis, Risks and benefits of tx options, Instructions for management, Patient and family education, Importance of tx compliance, Risk factor reductions, Impressions, Counseling / Coordination of care, Documenting in the medical record, Reviewing / ordering tests, medicine, procedures  , and Obtaining or reviewing history  .

## 2024-12-04 NOTE — PROGRESS NOTES
Name: Marcella Walls      : 1965      MRN: [id number]  Encounter Provider: WINDY Hamilton DO  Encounter Date: 2024   Encounter department: [facility] [place]    Note--this note should be read and incorporated in conjunction with the note that follows for the Medicare wellness visit portion of this dual OV.  That is important as the discussion points not listed here are addressed in the second note.  Thank you  Assessment & Plan  Essential hypertension         MACRINA (obstructive sleep apnea)         Encounter for Medicare annual wellness exam         Morbid obesity (HCC)           Need for hepatitis C screening test    Orders:    Hepatitis C Antibody; Future    Sicca, unspecified type (HCC)           Depression Screening and Follow-up Plan: Patient was screened for depression during today's encounter. They screened negative with a PHQ-2 score of 0.        History of Present Illness     History of Present Illness  The patient is a 59-year-old female who presents for evaluation of multiple medical concerns.    She reports that her cholesterol levels have improved. Her current medications include spironolactone, metformin, and atorvastatin. She has been taking Mounjaro 7.5 mg for the past month, after starting on a 5 mg dose four months ago. She has experienced a weight loss of 20 pounds over the past year, with her current weight being 251 pounds.    She is currently taking metformin 500 mg twice daily but reports experiencing diarrhea as a side effect.    She mentions that she has not had a Pap smear recently.    She has a bone density scan scheduled for the  of this month.    She has a history of spinal stenosis.    SOCIAL HISTORY  She does not smoke. She drinks very little alcohol.    FAMILY HISTORY  Her father  at the age of 53.    IMMUNIZATIONS  She has received her COVID-19 and influenza vaccines.     Review of Systems   Constitutional:  Negative for activity change, appetite  "change, chills, diaphoresis, fatigue and fever.   HENT:  Negative for congestion, ear discharge, ear pain, facial swelling, nosebleeds, sore throat, tinnitus, trouble swallowing and voice change.    Eyes:  Negative for photophobia, pain, discharge, redness, itching and visual disturbance.   Respiratory:  Negative for apnea, cough, choking, chest tightness and shortness of breath.    Cardiovascular:  Negative for chest pain, palpitations and leg swelling.   Gastrointestinal:  Negative for abdominal distention, abdominal pain, blood in stool, constipation, diarrhea, nausea and vomiting.   Endocrine: Negative for cold intolerance, heat intolerance, polydipsia, polyphagia and polyuria.   Genitourinary:  Negative for decreased urine volume, difficulty urinating, dysuria, enuresis, frequency, hematuria, pelvic pain, urgency and vaginal bleeding.   Musculoskeletal:  Negative for arthralgias, back pain, gait problem, joint swelling, neck pain and neck stiffness.   Skin:  Negative for color change, pallor and rash.   Allergic/Immunologic: Negative for immunocompromised state.   Neurological:  Negative for dizziness, seizures, facial asymmetry, light-headedness, numbness and headaches.   Hematological:  Negative for adenopathy.   Psychiatric/Behavioral:  Negative for agitation, behavioral problems, confusion, decreased concentration, dysphoric mood and hallucinations.      Objective   /96 (BP Location: Left arm, Patient Position: Sitting, Cuff Size: Standard)   Pulse 90   Temp 98 °F (36.7 °C) (Temporal)   Ht 5' 3\" (1.6 m)   Wt 114 kg (251 lb 3.2 oz)   SpO2 97%   BMI 44.50 kg/m²     Physical Exam    Physical Exam  Vitals and nursing note reviewed.   Constitutional:       General: She is not in acute distress.     Appearance: Normal appearance. She is not ill-appearing, toxic-appearing or diaphoretic.   HENT:      Head: Normocephalic.      Nose: Nose normal.      Mouth/Throat:      Mouth: Mucous membranes are moist. "      Pharynx: No oropharyngeal exudate or posterior oropharyngeal erythema.   Eyes:      Extraocular Movements: Extraocular movements intact.      Pupils: Pupils are equal, round, and reactive to light.   Cardiovascular:      Rate and Rhythm: Normal rate and regular rhythm.      Pulses: Normal pulses.      Heart sounds: Normal heart sounds.      No gallop.   Pulmonary:      Effort: No respiratory distress.      Breath sounds: Normal breath sounds. No wheezing, rhonchi or rales.   Abdominal:      General: Abdomen is flat.   Musculoskeletal:         General: No tenderness.      Cervical back: Normal range of motion and neck supple.      Right lower leg: No edema.      Left lower leg: No edema.   Skin:     General: Skin is warm.      Findings: No rash.   Neurological:      General: No focal deficit present.      Mental Status: She is alert and oriented to person, place, and time. Mental status is at baseline.   Psychiatric:         Mood and Affect: Mood normal.         Behavior: Behavior normal.         Thought Content: Thought content normal.         Judgment: Judgment normal.       Administrative Statements   I have spent a total time of 40 minutes in caring for this patient on the day of the visit/encounter including Diagnostic results, Prognosis, Risks and benefits of tx options, Instructions for management, Patient and family education, Importance of tx compliance, Risk factor reductions, Impressions, Counseling / Coordination of care, Documenting in the medical record, Reviewing / ordering tests, medicine, procedures  , and Obtaining or reviewing history  .

## 2024-12-04 NOTE — ASSESSMENT & PLAN NOTE
Weight 271 pounds November 15, 2023 that is 1 year ago today 251 pounds she is down 20 pounds she has been taking the Mounjaro about 4 months maybe 5 months now she is taking 7.5 mg/week and will continue that she has some diarrhea for the first 2 days after taking this but she is also taking metformin 2 tablets at bedtime her A1c is 6 point and her fasting blood sugar 108.  I think we can work to discontinue the metformin which may be the cause of her diarrhea in conjunction with the Mounjaro if not I have told her to take Imodium AD 2 caps on the day of taking the Mounjaro and perhaps a second day as well.  She is doing well we will continue that I would like to see her lose another 20 pounds.  I would like to see her continue with stricter diet and more activity.  She is not taking atorvastatin but I have urged her to go back on that having explained in detail her cholesterol and LDL levels and that they could be better although they are not terrible.

## 2024-12-07 ENCOUNTER — RESULTS FOLLOW-UP (OUTPATIENT)
Dept: FAMILY MEDICINE CLINIC | Facility: CLINIC | Age: 59
End: 2024-12-07

## 2024-12-24 DIAGNOSIS — E11.9 TYPE 2 DIABETES MELLITUS WITHOUT COMPLICATION, WITHOUT LONG-TERM CURRENT USE OF INSULIN (HCC): ICD-10-CM

## 2024-12-24 RX ORDER — METFORMIN HYDROCHLORIDE 500 MG/1
TABLET, EXTENDED RELEASE ORAL
Qty: 180 TABLET | Refills: 1 | Status: SHIPPED | OUTPATIENT
Start: 2024-12-24

## 2025-01-04 DIAGNOSIS — B37.2 MONILIASIS, CUTANEOUS: ICD-10-CM

## 2025-01-06 RX ORDER — NYSTATIN 100000 [USP'U]/G
1 POWDER TOPICAL 2 TIMES DAILY
Qty: 60 G | Refills: 0 | Status: SHIPPED | OUTPATIENT
Start: 2025-01-06

## 2025-02-01 DIAGNOSIS — B37.2 MONILIASIS, CUTANEOUS: ICD-10-CM

## 2025-02-03 RX ORDER — NYSTATIN 100000 [USP'U]/G
1 POWDER TOPICAL 2 TIMES DAILY
Qty: 60 G | Refills: 2 | Status: SHIPPED | OUTPATIENT
Start: 2025-02-03

## 2025-02-07 DIAGNOSIS — E78.2 MIXED HYPERLIPIDEMIA: ICD-10-CM

## 2025-02-07 DIAGNOSIS — I10 ESSENTIAL HYPERTENSION: ICD-10-CM

## 2025-02-07 DIAGNOSIS — E11.65 TYPE 2 DIABETES MELLITUS WITH HYPERGLYCEMIA, WITHOUT LONG-TERM CURRENT USE OF INSULIN (HCC): ICD-10-CM

## 2025-02-07 RX ORDER — ATORVASTATIN CALCIUM 10 MG/1
10 TABLET, FILM COATED ORAL DAILY
Qty: 90 TABLET | Refills: 0 | Status: SHIPPED | OUTPATIENT
Start: 2025-02-07

## 2025-02-26 ENCOUNTER — RA CDI HCC (OUTPATIENT)
Dept: OTHER | Facility: HOSPITAL | Age: 60
End: 2025-02-26

## 2025-03-05 ENCOUNTER — OFFICE VISIT (OUTPATIENT)
Dept: FAMILY MEDICINE CLINIC | Facility: CLINIC | Age: 60
End: 2025-03-05
Payer: MEDICARE

## 2025-03-05 VITALS
DIASTOLIC BLOOD PRESSURE: 92 MMHG | HEART RATE: 74 BPM | OXYGEN SATURATION: 97 % | SYSTOLIC BLOOD PRESSURE: 132 MMHG | BODY MASS INDEX: 44.76 KG/M2 | TEMPERATURE: 97.9 F | HEIGHT: 63 IN | WEIGHT: 252.6 LBS

## 2025-03-05 DIAGNOSIS — G47.33 OSA (OBSTRUCTIVE SLEEP APNEA): Primary | ICD-10-CM

## 2025-03-05 DIAGNOSIS — R60.0 LOCALIZED EDEMA: ICD-10-CM

## 2025-03-05 DIAGNOSIS — E66.01 MORBID OBESITY (HCC): ICD-10-CM

## 2025-03-05 DIAGNOSIS — E11.65 TYPE 2 DIABETES MELLITUS WITH HYPERGLYCEMIA, WITHOUT LONG-TERM CURRENT USE OF INSULIN (HCC): ICD-10-CM

## 2025-03-05 DIAGNOSIS — E11.9 TYPE 2 DIABETES MELLITUS WITHOUT COMPLICATION, WITHOUT LONG-TERM CURRENT USE OF INSULIN (HCC): ICD-10-CM

## 2025-03-05 DIAGNOSIS — E78.2 MIXED HYPERLIPIDEMIA: ICD-10-CM

## 2025-03-05 DIAGNOSIS — C56.1 OVARIAN CA, RIGHT (HCC): ICD-10-CM

## 2025-03-05 DIAGNOSIS — I10 ESSENTIAL HYPERTENSION: ICD-10-CM

## 2025-03-05 DIAGNOSIS — G47.33 OSA (OBSTRUCTIVE SLEEP APNEA): ICD-10-CM

## 2025-03-05 DIAGNOSIS — I10 ESSENTIAL HYPERTENSION: Primary | ICD-10-CM

## 2025-03-05 PROCEDURE — 99214 OFFICE O/P EST MOD 30 MIN: CPT | Performed by: FAMILY MEDICINE

## 2025-03-05 PROCEDURE — G2211 COMPLEX E/M VISIT ADD ON: HCPCS | Performed by: FAMILY MEDICINE

## 2025-03-05 RX ORDER — ATORVASTATIN CALCIUM 10 MG/1
10 TABLET, FILM COATED ORAL DAILY
Qty: 100 TABLET | Refills: 3 | Status: SHIPPED | OUTPATIENT
Start: 2025-03-05

## 2025-03-05 RX ORDER — METFORMIN HYDROCHLORIDE 500 MG/1
500 TABLET, EXTENDED RELEASE ORAL
Qty: 100 TABLET | Refills: 3 | Status: SHIPPED | OUTPATIENT
Start: 2025-03-05

## 2025-03-05 RX ORDER — SPIRONOLACTONE 100 MG/1
100 TABLET, FILM COATED ORAL EVERY MORNING
Qty: 100 TABLET | Refills: 3 | Status: SHIPPED | OUTPATIENT
Start: 2025-03-05

## 2025-03-05 RX ORDER — TIRZEPATIDE 10 MG/.5ML
10 INJECTION, SOLUTION SUBCUTANEOUS WEEKLY
Qty: 2 ML | Refills: 1 | Status: SHIPPED | OUTPATIENT
Start: 2025-03-05

## 2025-03-05 NOTE — PROGRESS NOTES
Name: Marcella Walls      : 1965      MRN: [id number]  Encounter Provider: WINDY Hamilton DO  Encounter Date: 3/5/2025   Encounter department: [facility] [place]    Assessment & Plan  Essential hypertension         MACRINA (obstructive sleep apnea)         Ovarian CA, right (HCC)         Type 2 diabetes mellitus with hyperglycemia, without long-term current use of insulin (HCC)    Lab Results   Component Value Date    HGBA1C 6.2 2024            Morbid obesity (HCC)  Peak weight 282 pounds about 3 years ago.  6 months ago 262 on  and today 252.  Taking Mounjaro 7.5 mg weekly and has been on Mounjaro for about 6 months.  Will continue that as well as stress diet and exercise           Assessment & Plan  1. Hypertension: Elevated.  - Continue current antihypertensive medication.  - Monitor blood pressure at home and keep a log.  - Educate on low-sodium diet and regular exercise.    Follow-up  - Schedule follow-up visit to reassess blood pressure control.       History of Present Illness     History of Present Illness  The patient is a 59-year-old female who presents for evaluation of hypertension.    Hypertension  - The patient reports overall good health, with the exception of her blood pressure, which remains elevated.    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     Review of Systems   Constitutional:  Negative for activity change, appetite change, chills, diaphoresis, fatigue and fever.   HENT:  Negative for congestion, ear discharge, ear pain, facial swelling, nosebleeds, sore throat, tinnitus, trouble swallowing and voice change.    Eyes:  Negative for photophobia, pain, discharge, redness, itching and visual disturbance.   Respiratory:  Negative for apnea, cough, choking, chest tightness and shortness of breath.    Cardiovascular:  Negative for chest pain, palpitations and leg swelling.   Gastrointestinal:  Negative for abdominal  "distention, abdominal pain, blood in stool, constipation, diarrhea, nausea and vomiting.   Endocrine: Negative for cold intolerance, heat intolerance, polydipsia, polyphagia and polyuria.   Genitourinary:  Negative for decreased urine volume, difficulty urinating, dysuria, enuresis, frequency, hematuria, pelvic pain, urgency and vaginal bleeding.   Musculoskeletal:  Negative for arthralgias, back pain, gait problem, joint swelling, neck pain and neck stiffness.   Skin:  Negative for color change, pallor and rash.   Allergic/Immunologic: Negative for immunocompromised state.   Neurological:  Negative for dizziness, seizures, facial asymmetry, light-headedness, numbness and headaches.   Hematological:  Negative for adenopathy.   Psychiatric/Behavioral:  Negative for agitation, behavioral problems, confusion, decreased concentration, dysphoric mood and hallucinations.      Objective   /92 (BP Location: Left arm, Patient Position: Sitting, Cuff Size: Standard)   Pulse 74   Temp 97.9 °F (36.6 °C) (Temporal)   Ht 5' 3\" (1.6 m)   Wt 115 kg (252 lb 9.6 oz)   SpO2 97%   BMI 44.75 kg/m²     Physical Exam    Physical Exam  Vitals and nursing note reviewed.   Constitutional:       General: She is not in acute distress.     Appearance: Normal appearance. She is not ill-appearing, toxic-appearing or diaphoretic.   HENT:      Head: Normocephalic.      Nose: Nose normal.      Mouth/Throat:      Mouth: Mucous membranes are moist.      Pharynx: No oropharyngeal exudate or posterior oropharyngeal erythema.   Eyes:      Extraocular Movements: Extraocular movements intact.      Pupils: Pupils are equal, round, and reactive to light.   Cardiovascular:      Rate and Rhythm: Normal rate and regular rhythm.      Pulses: Normal pulses.      Heart sounds: Normal heart sounds.      No gallop.   Pulmonary:      Effort: No respiratory distress.      Breath sounds: Normal breath sounds. No wheezing, rhonchi or rales.   Abdominal:      " General: Abdomen is flat.   Musculoskeletal:         General: No tenderness.      Cervical back: Normal range of motion and neck supple.      Right lower leg: No edema.      Left lower leg: No edema.   Skin:     General: Skin is warm.   Neurological:      General: No focal deficit present.      Mental Status: She is alert and oriented to person, place, and time. Mental status is at baseline.   Psychiatric:         Mood and Affect: Mood normal.         Behavior: Behavior normal.         Thought Content: Thought content normal.         Judgment: Judgment normal.     Administrative Statements   I have spent a total time of 30 minutes in caring for this patient on the day of the visit/encounter including Diagnostic results, Prognosis, Risks and benefits of tx options, Instructions for management, Patient and family education, Importance of tx compliance, Risk factor reductions, Impressions, Counseling / Coordination of care, Documenting in the medical record, Reviewing/placing orders in the medical record (including tests, medications, and/or procedures), and Obtaining or reviewing history  .

## 2025-03-05 NOTE — ASSESSMENT & PLAN NOTE
Peak weight 282 pounds about 3 years ago.  6 months ago 262 on August 1 and today 252.  Taking Mounjaro 7.5 mg weekly and has been on Mounjaro for about 6 months.  Will continue that as well as stress diet and exercise

## 2025-05-03 DIAGNOSIS — E55.9 VITAMIN D DEFICIENCY: ICD-10-CM

## 2025-05-03 DIAGNOSIS — E11.65 TYPE 2 DIABETES MELLITUS WITH HYPERGLYCEMIA, WITHOUT LONG-TERM CURRENT USE OF INSULIN (HCC): ICD-10-CM

## 2025-05-03 DIAGNOSIS — I10 ESSENTIAL HYPERTENSION: ICD-10-CM

## 2025-05-03 DIAGNOSIS — E78.2 MIXED HYPERLIPIDEMIA: ICD-10-CM

## 2025-05-05 RX ORDER — ACETAMINOPHEN 160 MG
2000 TABLET,DISINTEGRATING ORAL DAILY
Qty: 90 CAPSULE | Refills: 1 | Status: SHIPPED | OUTPATIENT
Start: 2025-05-05

## 2025-07-10 ENCOUNTER — RA CDI HCC (OUTPATIENT)
Dept: OTHER | Facility: HOSPITAL | Age: 60
End: 2025-07-10